# Patient Record
Sex: MALE | Race: WHITE | Employment: FULL TIME | URBAN - METROPOLITAN AREA
[De-identification: names, ages, dates, MRNs, and addresses within clinical notes are randomized per-mention and may not be internally consistent; named-entity substitution may affect disease eponyms.]

---

## 2020-08-19 ENCOUNTER — HOSPITAL ENCOUNTER (EMERGENCY)
Age: 45
Discharge: HOME | End: 2020-08-19
Attending: EMERGENCY MEDICINE
Payer: COMMERCIAL

## 2020-08-19 VITALS
DIASTOLIC BLOOD PRESSURE: 110 MMHG | BODY MASS INDEX: 42.66 KG/M2 | SYSTOLIC BLOOD PRESSURE: 183 MMHG | OXYGEN SATURATION: 97 % | RESPIRATION RATE: 18 BRPM | WEIGHT: 315 LBS | HEIGHT: 72 IN | HEART RATE: 96 BPM | TEMPERATURE: 97.2 F

## 2020-08-19 PROCEDURE — 99282 EMERGENCY DEPT VISIT SF MDM: CPT

## 2020-08-19 PROCEDURE — 90471 IMMUNIZATION ADMIN: CPT | Performed by: GENERAL PRACTICE

## 2020-08-19 PROCEDURE — 96372 THER/PROPH/DIAG INJ SC/IM: CPT

## 2020-08-19 PROCEDURE — 6360000002 HC RX W HCPCS: Performed by: GENERAL PRACTICE

## 2020-08-19 PROCEDURE — 90715 TDAP VACCINE 7 YRS/> IM: CPT | Performed by: GENERAL PRACTICE

## 2020-08-19 RX ADMIN — TETANUS TOXOID, REDUCED DIPHTHERIA TOXOID AND ACELLULAR PERTUSSIS VACCINE, ADSORBED 0.5 ML: 5; 2.5; 8; 8; 2.5 SUSPENSION INTRAMUSCULAR at 17:42

## 2020-08-19 ASSESSMENT — ENCOUNTER SYMPTOMS
SORE THROAT: 0
ABDOMINAL PAIN: 0
VOMITING: 0
EYE REDNESS: 0
EYE PAIN: 0
SHORTNESS OF BREATH: 0
DIARRHEA: 0
WHEEZING: 0
SINUS PRESSURE: 0
NAUSEA: 0
EYE DISCHARGE: 0
BACK PAIN: 0
COUGH: 0

## 2020-08-19 NOTE — ED PROVIDER NOTES
Janice Gomez is a 55-year-old gentleman with a noncontributory past medical history presents with chief complaint of abdominal injury secondary to foreign body missle. History comes primarily from the patient. Janice Gomez states that he was drilling through [place] with a titanium drill when his titanium drill shattered, and a section of this drill struck him in the abdomen. He states that he was able to find 2 of the 3 broken pieces of this drill bit, however he was never able to find the remaining piece. He states that his abdomen have a puncture wound that bled for approximately 10 minutes prior to resolving, and he presented to [address] [place] [address] out of concern for a possible embedded foreign body as well as requirement for a tetanus shot. Review of Systems   Constitutional: Negative for chills and fever. HENT: Negative for ear pain, sinus pressure and sore throat. Eyes: Negative for pain, discharge and redness. Respiratory: Negative for cough, shortness of breath and wheezing. Cardiovascular: Negative for chest pain. Gastrointestinal: Negative for abdominal pain, diarrhea, nausea and vomiting. Genitourinary: Negative for dysuria and frequency. Musculoskeletal: Negative for arthralgias and back pain. Skin: Positive for wound. Negative for rash. Neurological: Negative for weakness and headaches. Hematological: Negative for adenopathy. All other systems reviewed and are negative. Physical Exam  Constitutional:       General: He is not in acute distress. Appearance: He is well-developed. He is not diaphoretic. HENT:      Head: Normocephalic and atraumatic. Mouth/Throat:      Dentition: Abnormal dentition. Eyes:      Pupils: Pupils are equal, round, and reactive to light. Neck:      Musculoskeletal: Normal range of motion. Vascular: No JVD. Trachea: No tracheal deviation. Cardiovascular:      Rate and Rhythm: Regular rhythm. Heart sounds:  No murmur. No friction rub. No gallop. Pulmonary:      Effort: Pulmonary effort is normal. No respiratory distress. Breath sounds: Normal breath sounds. No stridor. No wheezing or rales. Chest:      Chest wall: No tenderness. Abdominal:      General: Bowel sounds are normal. There is no distension. Palpations: Abdomen is soft. Tenderness: There is no abdominal tenderness. There is no guarding. Musculoskeletal: Normal range of motion. Skin:     General: Skin is warm and dry. Capillary Refill: Capillary refill takes less than 2 seconds. Comments: Small punctate wound noted to the right of the umbilicus that appears scabbed over with no obvious hemorrhage. Area is mildly tender to palpation with no obvious subcutaneous foreign body appreciated   Neurological:      General: No focal deficit present. Mental Status: He is alert and oriented to person, place, and time. Cranial Nerves: No cranial nerve deficit. Psychiatric:         Behavior: Behavior normal.          Procedures     MDM  Number of Diagnoses or Management Options  Essential hypertension:   Puncture wound of abdomen, initial encounter:   Diagnosis management comments: The patient's emergency department work-up including history, physical exam, vital signs, laboratory studies, imaging studies and reevaluation after initial treatment are reassuring for discharge to home with close outpatient follow-up. Specifically, Toribio Meredith had no evidence of foreign body appreciable on his physical exam or on ultrasound assessment of the wound. His vital signs are stable, his symptoms were controlled supportively, and he was amenable for outpatient follow-up. They were advised to return to the emergency department should they develop fever, chills, night sweats, nausea, vomiting, diarrhea, chest pain, shortness of breath, or worsening of their symptoms despite treatment from this emergency department visit.   They were instructed to follow-up with their primary care provider in 2 days. This information was relayed to the patient who understood this plan of care and was amenable to the plan. ED Course as of Aug 19 1753   Wed Aug 19, 2020   1732   ATTENDING PROVIDER ATTESTATION:     I have personally performed and/or participated in the history, exam, medical decision making, and procedures and agree with all pertinent clinical information unless otherwise noted. I have also reviewed and agree with the past medical, family and social history unless otherwise noted. I have discussed this patient in detail with the resident and provided the instruction and education regarding the evidence-based evaluation and treatment of [unfilled]  History: patient was drilling through a piece of sheet metal.  The drill bit broke and hit his abdomen. The bleeding was controlled with pressure. My findings: Janelle Rausch is a 39 y.o. male whom is in no distress. Physical exam reveals well appearing. Abdomen with a small area of ecchymosis and superficial abrasion. Bedside US without evidence of foreign body. No abdominal tenderness. My plan: Symptomatic and supportive care. Will up date tetanus and have him follow up with his PCP for his elevated blood pressure. Electronically signed by Guillermo Galvez DO on 8/19/20 at 5:32 PM EDT          [JS]      ED Course User Index  [JS] Guillermo Galvez DO        ED Course as of Aug 19 1753   Wed Aug 19, 2020   1732   ATTENDING PROVIDER ATTESTATION:     I have personally performed and/or participated in the history, exam, medical decision making, and procedures and agree with all pertinent clinical information unless otherwise noted. I have also reviewed and agree with the past medical, family and social history unless otherwise noted.     I have discussed this patient in detail with the resident and provided the instruction and education regarding the evidence-based evaluation and treatment of [unfilled]  History: patient was drilling through a piece of sheet metal.  The drill bit broke and hit his abdomen. The bleeding was controlled with pressure. My findings: Do Cowart is a 39 y.o. male whom is in no distress. Physical exam reveals well appearing. Abdomen with a small area of ecchymosis and superficial abrasion. Bedside US without evidence of foreign body. No abdominal tenderness. My plan: Symptomatic and supportive care. Will up date tetanus and have him follow up with his PCP for his elevated blood pressure. Electronically signed by Hussain Cazares DO on 8/19/20 at 5:32 PM EDT          [JS]      ED Course User Index  [JS] Hussain Cazares DO       --------------------------------------------- PAST HISTORY ---------------------------------------------  Past Medical History:  has a past medical history of Hypertension. Past Surgical History:  has a past surgical history that includes Foot Amputation; Colonoscopy; and Foot Debridement (2-). Social History:  reports that he has been smoking. He has a 25.00 pack-year smoking history. He has never used smokeless tobacco. He reports current alcohol use. He reports that he does not use drugs. Family History: family history is not on file. The patients home medications have been reviewed. Allergies: Patient has no known allergies. -------------------------------------------------- RESULTS -------------------------------------------------  Labs:  No results found for this visit on 08/19/20. Radiology:  No orders to display       ------------------------- NURSING NOTES AND VITALS REVIEWED ---------------------------  Date / Time Roomed:  8/19/2020  4:44 PM  ED Bed Assignment:  06/06    The nursing notes within the ED encounter and vital signs as below have been reviewed.    BP (!) 183/110   Pulse 96   Temp 97.2 °F (36.2 °C) (Infrared)   Resp 18 Ht 6' (1.829 m)   Wt (!) 320 lb (145.2 kg)   SpO2 97%   BMI 43.40 kg/m²   Oxygen Saturation Interpretation: Normal      ------------------------------------------ PROGRESS NOTES ------------------------------------------  5:14 PM EDT  I have spoken with the patient and discussed todays results, in addition to providing specific details for the plan of care and counseling regarding the diagnosis and prognosis. Their questions are answered at this time and they are agreeable with the plan. I discussed at length with them reasons for immediate return here for re evaluation. They will followup with their primary care provider for further evaluation and treatment.      --------------------------------- ADDITIONAL PROVIDER NOTES ---------------------------------  At this time the patient is without objective evidence of an acute process requiring hospitalization or inpatient management. They have remained hemodynamically stable throughout their entire ED visit and are stable for discharge with outpatient follow-up. The plan has been discussed in detail and they are aware of the specific conditions for emergent return, as well as the importance of follow-up. New Prescriptions    No medications on file       Diagnosis:  1. Puncture wound of abdomen, initial encounter    2. Essential hypertension        Disposition:  Patient's disposition: Discharge to home  Patient's condition is stable. Jose Angel Howard  PGY-2  8:42 PM EDT       Cruz Burrell 43., DO  Resident  08/20/20 2043

## 2021-08-16 ENCOUNTER — OFFICE VISIT (OUTPATIENT)
Dept: FAMILY MEDICINE CLINIC | Age: 46
End: 2021-08-16
Payer: COMMERCIAL

## 2021-08-16 VITALS
RESPIRATION RATE: 18 BRPM | SYSTOLIC BLOOD PRESSURE: 187 MMHG | HEIGHT: 72 IN | WEIGHT: 315 LBS | DIASTOLIC BLOOD PRESSURE: 94 MMHG | TEMPERATURE: 97.6 F | OXYGEN SATURATION: 96 % | HEART RATE: 99 BPM | BODY MASS INDEX: 42.66 KG/M2

## 2021-08-16 DIAGNOSIS — J02.9 SORE THROAT: ICD-10-CM

## 2021-08-16 DIAGNOSIS — I10 HYPERTENSION, UNSPECIFIED TYPE: ICD-10-CM

## 2021-08-16 DIAGNOSIS — R05.9 COUGH: Primary | ICD-10-CM

## 2021-08-16 DIAGNOSIS — U07.1 COVID-19: Primary | ICD-10-CM

## 2021-08-16 DIAGNOSIS — R51.9 NONINTRACTABLE HEADACHE, UNSPECIFIED CHRONICITY PATTERN, UNSPECIFIED HEADACHE TYPE: ICD-10-CM

## 2021-08-16 LAB
Lab: ABNORMAL
PERFORMING INSTRUMENT: ABNORMAL
QC PASS/FAIL: ABNORMAL
SARS-COV-2, POC: DETECTED

## 2021-08-16 PROCEDURE — 87426 SARSCOV CORONAVIRUS AG IA: CPT | Performed by: NURSE PRACTITIONER

## 2021-08-16 PROCEDURE — 99213 OFFICE O/P EST LOW 20 MIN: CPT | Performed by: NURSE PRACTITIONER

## 2021-08-16 RX ORDER — DEXAMETHASONE 6 MG/1
6 TABLET ORAL
Qty: 7 TABLET | Refills: 0 | Status: SHIPPED | OUTPATIENT
Start: 2021-08-16 | End: 2021-08-23

## 2021-08-16 RX ORDER — ZINC SULFATE 50(220)MG
50 CAPSULE ORAL DAILY
Qty: 7 CAPSULE | Refills: 0 | COMMUNITY
Start: 2021-08-16 | End: 2021-08-23

## 2021-08-16 RX ORDER — ASCORBIC ACID 500 MG
500 TABLET ORAL 2 TIMES DAILY
Qty: 14 TABLET | Refills: 0 | Status: SHIPPED | OUTPATIENT
Start: 2021-08-16 | End: 2021-08-23

## 2021-08-16 RX ORDER — AZITHROMYCIN 250 MG/1
TABLET, FILM COATED ORAL
Qty: 6 TABLET | Refills: 0 | Status: SHIPPED | OUTPATIENT
Start: 2021-08-16 | End: 2021-08-21

## 2021-08-16 RX ORDER — AMLODIPINE BESYLATE 5 MG/1
5 TABLET ORAL DAILY
Qty: 30 TABLET | Refills: 3 | Status: SHIPPED
Start: 2021-08-16 | End: 2022-11-03

## 2021-08-16 SDOH — ECONOMIC STABILITY: FOOD INSECURITY: WITHIN THE PAST 12 MONTHS, THE FOOD YOU BOUGHT JUST DIDN'T LAST AND YOU DIDN'T HAVE MONEY TO GET MORE.: NEVER TRUE

## 2021-08-16 SDOH — ECONOMIC STABILITY: FOOD INSECURITY: WITHIN THE PAST 12 MONTHS, YOU WORRIED THAT YOUR FOOD WOULD RUN OUT BEFORE YOU GOT MONEY TO BUY MORE.: NEVER TRUE

## 2021-08-16 ASSESSMENT — PATIENT HEALTH QUESTIONNAIRE - PHQ9
1. LITTLE INTEREST OR PLEASURE IN DOING THINGS: 0
SUM OF ALL RESPONSES TO PHQ QUESTIONS 1-9: 0
2. FEELING DOWN, DEPRESSED OR HOPELESS: 0
SUM OF ALL RESPONSES TO PHQ QUESTIONS 1-9: 0
SUM OF ALL RESPONSES TO PHQ QUESTIONS 1-9: 0
SUM OF ALL RESPONSES TO PHQ9 QUESTIONS 1 & 2: 0

## 2021-08-16 ASSESSMENT — SOCIAL DETERMINANTS OF HEALTH (SDOH): HOW HARD IS IT FOR YOU TO PAY FOR THE VERY BASICS LIKE FOOD, HOUSING, MEDICAL CARE, AND HEATING?: NOT HARD AT ALL

## 2021-08-16 NOTE — PROGRESS NOTES
Chief Complaint   Sinus Problem (pressure in head and dizziness , headaches started yesterday )      History of Present Illness   Source of history provided by:  patientMilton Mullen  is a 55 y.o. old male who has a past medical history of:   Past Medical History:   Diagnosis Date    Hypertension     not on meds    Presents to the flu clinic with complaints of Generalized Body Aches and dizziness x 2 days. States symptoms have stayed the same since onset. Has been taking none without symptomatic relief. Denies any Fever, Cough, Shortness of breath, Nausea or Vomiting. Denies any hx of pneumonia. Patient is a smoker  ROS   Pertinent positives and negatives are stated within HPI, all other systems reviewed and are negative. Surgical History:  has a past surgical history that includes Foot Amputation; Colonoscopy; and Foot Debridement (2-). Social History:  reports that he has been smoking. He has a 25.00 pack-year smoking history. He has never used smokeless tobacco. He reports current alcohol use. He reports that he does not use drugs. Family History: family history is not on file. Allergies: Patient has no known allergies. Physical Exam      VS:  BP (!) 187/94   Pulse 99   Temp 97.6 °F (36.4 °C) (Temporal)   Resp 18   Ht 6' (1.829 m)   Wt (!) 333 lb (151 kg)   SpO2 96%   BMI 45.16 kg/m²    Oxygen Saturation Interpretation: Normal.    Constitutional:  Alert, development consistent with age. NAD. Head:  NC/NT. Airway patent. Ears: TMs wnl bilaterally. Canals without exudate or swelling bilaterally. Mouth: Posterior pharynx with mild erythema and clear postnasal drip. moderate tonsillar hypertrophy or exudate. Neck:  Normal ROM. Supple. mild anterior cervical adenopathy noted. Lungs: CTAB without wheezes, rales, or rhonchi. CV:  Regular rate and rhythm, normal heart sounds, without pathological murmurs, ectopy, gallops, or rubs. Skin:  Normal turgor.   Warm, dry, without visible rash. Lymphatic: No lymphangitis or adenopathy noted. Neurological:  Oriented. Motor functions intact. Lab / Imaging Results   (All laboratory and radiology results have been personally reviewed by myself)  Labs:  No results found for this visit on 08/16/21. Imaging: All Radiology results interpreted by Radiologist unless otherwise noted. No results found. Medical Decision Making   Pt non-toxic, in no apparent distress and stable at time of discharge. Assessment/Plan   Yonas Castelan was seen today for sinus problem. Diagnoses and all orders for this visit:    COVID-19  -     azithromycin (ZITHROMAX) 250 MG tablet; Take 2 tablets by mouth daily for 1 day, THEN 1 tablet daily for 4 days. -     ascorbic acid (VITAMIN C) 500 MG tablet; Take 1 tablet by mouth 2 times daily for 7 days  -     zinc sulfate (ZINCATE) 220 (50 Zn) MG capsule; Take 1 capsule by mouth daily for 7 days  -     dexamethasone (DECADRON) 6 MG tablet; Take 1 tablet by mouth daily (with breakfast) for 7 days  -     amLODIPine (NORVASC) 5 MG tablet; Take 1 tablet by mouth daily    Hypertension, unspecified type  -     dexamethasone (DECADRON) 6 MG tablet; Take 1 tablet by mouth daily (with breakfast) for 7 days  -     amLODIPine (NORVASC) 5 MG tablet; Take 1 tablet by mouth daily        Patient to call to set up appointment with new PCP   Advised strict self-quarantine at home in the interim. Work excuse provided to patient today. Increase fluids and rest. Symptomatic relief discussed including Tylenol prn pain/fever. Schedule f/u with PCP in 7-10 days if symptoms persist. ED sooner if symptoms worsen or change. ED immediately with high or refractory fever, progressive SOB, dyspnea, CP, calf pain/swelling, shaking chills, vomiting, abdominal pain, lethargy, flank pain, or decreased urinary output. Pt verbalizes understanding and is in agreement with plan of care. All questions answered.     Jazlyn Null, APRN - CNP    This visit was provided as a focused evaluation during the COVID -19 pandemic/national emergency. A comprehensive review of all previous patient history and testing was not conducted. Pertinent findings were elicited during the visit.

## 2021-08-16 NOTE — PATIENT INSTRUCTIONS
Patient Education        Learning About COVID-19 and Flu Symptoms  How can you tell COVID-19 from the flu? COVID-19 and the flu have similar symptoms. The two can be hard to tell apart. The only way to know for sure which illness you have is to be tested. Since the symptoms are so alike, it makes sense to act as if you have COVID-19 until your test results come back. This means staying home and limiting contact with people in your home. You'll need to wash your hands often and disinfect surfaces that you touch. And be sure to wear a mask or face covering when you're around other people. This is also good advice if you think you have the flu. COVID-19 and the flu have these symptoms in common:  · Fever or chills  · Cough  · Shortness of breath  · Fatigue (tiredness)  · Sore throat  · Runny or stuffy nose  · Muscle pain or body aches  · Headache  · Vomiting and diarrhea (more common in children than adults)  COVID-19 has another symptom that also may occur:  · New loss of taste or smell  COVID-19 symptoms may appear from 2 to 14 days after infection. Flu symptoms usually appear 1 to 4 days after infection. Why should you get a flu shot during the COVID-19 pandemic? It's important to get your yearly flu vaccine. Both the flu and COVID-19 are expected to be active during flu season. You can get sick with both infections at once. And having both may make you more sick than getting just one. The flu vaccine won't protect you from COVID-19. But it can help prevent the flu or reduce its symptoms. If fewer people get very ill with the flu, this will help free up medical resources that are needed for COVID-19 patients. Where can you learn more? Go to https://[employer] View[employer]. org and sign in to your [employer] account. Enter C123 in the Connected box to learn more about \"Learning About COVID-19 and Flu Symptoms. \"     If you do not have an account, please click on the \"Sign Up Now\" link.  Current as of: March 26, 2021               Content Version: 12.9  © 2006-2021 Healthwise, [employer]. Care instructions adapted under license by [facility] ([facility]). If you have questions about a medical condition or this instruction, always ask your healthcare professional. [address] any warranty or liability for your use of this information. Patient Education        10 Things to Do When You Have COVID-19    Stay home. Don't go to school, work, or public areas. And don't use public transportation, ride-shares, or taxis unless you have no choice. Leave your home only if you need to get medical care. But call the doctor's office first so they know you're coming. And wear a cloth face cover. Ask before leaving isolation. Talk with your doctor or other health professional about when it will be safe for you to leave isolation. Wear a cloth face cover when you are around other people. It can help stop the spread of the virus when you cough or sneeze. Limit contact with people in your home. If possible, stay in a separate bedroom and use a separate bathroom. Avoid contact with pets and other animals. If possible, have a friend or family member care for them while you're sick. Cover your mouth and nose with a tissue when you cough or sneeze. Then throw the tissue in the trash right away. Wash your hands often, especially after you cough or sneeze. Use soap and water, and scrub for at least 20 seconds. If soap and water aren't available, use an alcohol-based hand . Don't share personal household items. These include bedding, towels, cups and glasses, and eating utensils. Clean and disinfect your home every day. Use household  or disinfectant wipes or sprays. Take special care to clean things that you grab with your hands. These include doorknobs, remote controls, phones, and handles on your refrigerator and microwave.  And don't forget countertops, tabletops, bathrooms, and computer keyboards. Take acetaminophen (Tylenol) to relieve fever and body aches. Read and follow all instructions on the label. Current as of: March 26, 2021               Content Version: 12.9  © 3532-0057 Healthwise, Incorporated. Care instructions adapted under license by [facility] ([facility]). If you have questions about a medical condition or this instruction, always ask your healthcare professional. [place] [address] any warranty or liability for your use of this information.

## 2021-08-16 NOTE — LETTER
[address], FITZ - CNP      8/16/2021     Patient: Bianca Curry   YOB: 1975       To Whom It May Concern: It is my medical opinion that Bianca Curry should remain out of work while acutely ill and positive COVID-19 test results. If tests positive for COVID-19, needs minimum of 10 days strict quarantine, improvement of symptoms and 24 hours fever free without fever reducing medications. If you have any questions or concerns, please don't hesitate to call.     Sincerely,        Kate Tariq, FITZ - DAWIT

## 2021-12-12 ENCOUNTER — HOSPITAL ENCOUNTER (EMERGENCY)
Age: 46
Discharge: HOME | End: 2021-12-12
Attending: EMERGENCY MEDICINE
Payer: COMMERCIAL

## 2021-12-12 VITALS
WEIGHT: 315 LBS | BODY MASS INDEX: 42.66 KG/M2 | RESPIRATION RATE: 18 BRPM | TEMPERATURE: 97.9 F | HEIGHT: 72 IN | HEART RATE: 95 BPM | OXYGEN SATURATION: 96 % | SYSTOLIC BLOOD PRESSURE: 204 MMHG | DIASTOLIC BLOOD PRESSURE: 99 MMHG

## 2021-12-12 DIAGNOSIS — R03.0 ELEVATED BLOOD PRESSURE READING: ICD-10-CM

## 2021-12-12 DIAGNOSIS — H65.191 OTHER NON-RECURRENT ACUTE NONSUPPURATIVE OTITIS MEDIA OF RIGHT EAR: Primary | ICD-10-CM

## 2021-12-12 DIAGNOSIS — H60.391 OTHER INFECTIVE ACUTE OTITIS EXTERNA OF RIGHT EAR: ICD-10-CM

## 2021-12-12 PROCEDURE — 6370000000 HC RX 637 (ALT 250 FOR IP): Performed by: NURSE PRACTITIONER

## 2021-12-12 PROCEDURE — 99283 EMERGENCY DEPT VISIT LOW MDM: CPT

## 2021-12-12 RX ORDER — AMOXICILLIN AND CLAVULANATE POTASSIUM 875; 125 MG/1; MG/1
1 TABLET, FILM COATED ORAL ONCE
Status: COMPLETED | OUTPATIENT
Start: 2021-12-12 | End: 2021-12-12

## 2021-12-12 RX ORDER — AMOXICILLIN AND CLAVULANATE POTASSIUM 875; 125 MG/1; MG/1
1 TABLET, FILM COATED ORAL 2 TIMES DAILY
Qty: 20 TABLET | Refills: 0 | Status: SHIPPED | OUTPATIENT
Start: 2021-12-12 | End: 2021-12-22

## 2021-12-12 RX ADMIN — AMOXICILLIN AND CLAVULANATE POTASSIUM 1 TABLET: 875; 125 TABLET, FILM COATED ORAL at 23:14

## 2021-12-12 ASSESSMENT — PAIN SCALES - GENERAL: PAINLEVEL_OUTOF10: 7

## 2021-12-12 ASSESSMENT — PAIN DESCRIPTION - FREQUENCY: FREQUENCY: CONTINUOUS

## 2021-12-12 ASSESSMENT — PAIN DESCRIPTION - LOCATION: LOCATION: EAR

## 2021-12-12 ASSESSMENT — PAIN DESCRIPTION - PROGRESSION: CLINICAL_PROGRESSION: GRADUALLY WORSENING

## 2021-12-12 ASSESSMENT — PAIN DESCRIPTION - DESCRIPTORS: DESCRIPTORS: PRESSURE

## 2021-12-12 ASSESSMENT — PAIN DESCRIPTION - ONSET: ONSET: GRADUAL

## 2021-12-12 ASSESSMENT — PAIN DESCRIPTION - PAIN TYPE: TYPE: ACUTE PAIN

## 2021-12-12 ASSESSMENT — PAIN DESCRIPTION - ORIENTATION: ORIENTATION: RIGHT

## 2021-12-12 ASSESSMENT — PAIN - FUNCTIONAL ASSESSMENT: PAIN_FUNCTIONAL_ASSESSMENT: ACTIVITIES ARE NOT PREVENTED

## 2021-12-16 NOTE — ED PROVIDER NOTES
[facility]    Department of Emergency Medicine   ED  Provider Note  Admit Date/RoomTime: 12/12/2021 10:48 PM  ED Room: [place] [address]/INT-02    12/16/21  1:24 PM EST    History of Present Illness:   Sameer Villanueva is a 55 y.o. male presenting to the ED for right ear pain which started 2 days ago. The complaint has been constant, moderate in severity, and worsened by nothing. Patient denies hearing loss, ear drainage, tinnitus, fever, chills, headache, dizziness. Patient denies any recent infections or antibiotic use. No other symptoms presently. Review of Systems:   A complete review of systems was performed and pertinent positives and negatives are stated within HPI, all other systems reviewed and are negative.          --------------------------------------------- PAST HISTORY ---------------------------------------------  Past Medical History:  has a past medical history of Hypertension. Past Surgical History:  has a past surgical history that includes Foot Amputation; Colonoscopy; and Foot Debridement (2-). Social History:  reports that he has been smoking. He has a 12.50 pack-year smoking history. He has never used smokeless tobacco. He reports current alcohol use. He reports that he does not use drugs. Family History: family history is not on file. Unless otherwise noted, family history is non contributory    The patients home medications have been reviewed. Allergies: Patient has no known allergies. -------------------------------------------------- RESULTS -------------------------------------------------  All laboratory and radiology results have been personally reviewed by myself   LABS:  No results found for this visit on 12/12/21. RADIOLOGY:  Interpreted by Radiologist.  No orders to display       ------------------------- NURSING NOTES AND VITALS REVIEWED ---------------------------   The nursing notes within the ED encounter and vital signs as below have been reviewed. BP (!) 204/99   Pulse 95   Temp 97.9 °F (36.6 °C) (Infrared)   Resp 18   Ht 6' (1.829 m)   Wt (!) 330 lb (149.7 kg)   SpO2 96%   BMI 44.76 kg/m²   Oxygen Saturation Interpretation: Normal      ---------------------------------------------------PHYSICAL EXAM--------------------------------------    Constitutional/General: Alert and oriented x3, well appearing, non toxic in NAD, pleasant, calm, cooperative. Head: Normocephalic and atraumatic  Eyes: PERRL, EOMI, conjunctiva normal, sclera non icteric, no eye drainage  Ears: right TM erythematous & bugling, pain with palpation of pinna and tragus. No mastoid tenderness or erythema. No ear drainage. Mouth: Oropharynx clear, without erythema, handling secretions, no trismus, no asymmetry of the posterior oropharynx or uvular edema. No tongue/lip swelling. Neck: Supple, full ROM, non tender to palpatioin the midline, no stridor, no crepitus, no meningeal signs. No lymphadenopathy. Respiratory: Lungs clear to auscultation bilaterally, no wheezes, rales, or rhonchi. Not in respiratory distress. Respirations easy and unlabored. Cardiovascular:  S1S2. Regular rate. Regular rhythm. No murmurs, gallops, or rubs. 2+ distal pulses  Chest: No chest wall tenderness  GI:  Abdomen Soft, Non tender, Non distended. +BS x 4 quadrants. No organomegaly, no palpable masses,  No rebound, guarding, or rigidity. Musculoskeletal: Moves all extremities x 4. Warm and well perfused, no clubbing, cyanosis, or edema. Capillary refill <3 seconds  Integument: skin warm and dry. No rashes.    Lymphatic: no lymphadenopathy noted  Neurologic: GCS 15, no focal deficits, symmetric strength 5/5 in the upper and lower extremities bilaterally  Psychiatric: Normal Affect      ------------------------------ ED COURSE/MEDICAL DECISION MAKING----------------------  Medications   amoxicillin-clavulanate (AUGMENTIN) 875-125 MG per tablet 1 tablet (1 tablet Oral Given 12/12/21 3955)         Medical Decision Making:    Patient is a 59-year-old male presenting to the emergency department today for right ear pain x2 days. Physical examination shows a right tympanic membrane which is erythematous and bulging with pain with palpation of pinna and tragus on the right side. There is no evidence of mastoiditis. Patient will be treated for otitis media/otitis externa with Augmentin and Cortisporin drops. Patient can take Tylenol or Motrin for pain relief if necessary. Patient educated on warning signs for which he should return to the emergency department for and he verbalized understanding. BP rechecked before discharge, elevated still at 180/92. Patient educated that he needs to follow-up with her provider and have his blood pressure rechecked in 1 to 2 days. Patient agreeable with this plan and he will be discharged home. Counseling: The emergency provider has spoken with the patient and discussed todays results, in addition to providing specific details for the plan of care and counseling regarding the diagnosis and prognosis. Questions are answered at this time and they are agreeable with the plan.      --------------------------------- IMPRESSION AND DISPOSITION ---------------------------------    IMPRESSION  1. Other non-recurrent acute nonsuppurative otitis media of right ear    2. Other infective acute otitis externa of right ear    3.  Elevated blood pressure reading        DISPOSITION  Disposition: Discharge to home  Patient condition is stable             FITZ Acevedo CNP  12/16/21 0339

## 2022-05-09 ENCOUNTER — APPOINTMENT (OUTPATIENT)
Dept: GENERAL RADIOLOGY | Age: 47
End: 2022-05-09
Payer: COMMERCIAL

## 2022-05-09 ENCOUNTER — HOSPITAL ENCOUNTER (EMERGENCY)
Age: 47
Discharge: HOME | End: 2022-05-09
Payer: COMMERCIAL

## 2022-05-09 VITALS
DIASTOLIC BLOOD PRESSURE: 84 MMHG | HEART RATE: 82 BPM | TEMPERATURE: 97.8 F | SYSTOLIC BLOOD PRESSURE: 196 MMHG | OXYGEN SATURATION: 95 % | RESPIRATION RATE: 18 BRPM

## 2022-05-09 DIAGNOSIS — I10 ESSENTIAL HYPERTENSION: ICD-10-CM

## 2022-05-09 DIAGNOSIS — M25.562 ACUTE PAIN OF LEFT KNEE: ICD-10-CM

## 2022-05-09 DIAGNOSIS — S83.402A SPRAIN OF COLLATERAL LIGAMENT OF LEFT KNEE, INITIAL ENCOUNTER: Primary | ICD-10-CM

## 2022-05-09 PROCEDURE — 6370000000 HC RX 637 (ALT 250 FOR IP): Performed by: PHYSICIAN ASSISTANT

## 2022-05-09 PROCEDURE — 99283 EMERGENCY DEPT VISIT LOW MDM: CPT

## 2022-05-09 PROCEDURE — 73562 X-RAY EXAM OF KNEE 3: CPT

## 2022-05-09 RX ORDER — NAPROXEN 500 MG/1
500 TABLET ORAL ONCE
Status: COMPLETED | OUTPATIENT
Start: 2022-05-09 | End: 2022-05-09

## 2022-05-09 RX ORDER — ACETAMINOPHEN 500 MG
1000 TABLET ORAL ONCE
Status: DISCONTINUED | OUTPATIENT
Start: 2022-05-09 | End: 2022-05-09

## 2022-05-09 RX ORDER — NAPROXEN 500 MG/1
500 TABLET ORAL 2 TIMES DAILY WITH MEALS
Status: DISCONTINUED | OUTPATIENT
Start: 2022-05-10 | End: 2022-05-09

## 2022-05-09 RX ORDER — NAPROXEN 500 MG/1
500 TABLET ORAL 2 TIMES DAILY WITH MEALS
Qty: 10 TABLET | Refills: 0 | Status: SHIPPED | OUTPATIENT
Start: 2022-05-09 | End: 2022-05-14

## 2022-05-09 RX ADMIN — NAPROXEN 500 MG: 500 TABLET ORAL at 22:14

## 2022-05-09 NOTE — Clinical Note
Marcial Miles was seen and treated in our emergency department on 5/9/2022. He may return to work on 05/11/2022. If you have any questions or concerns, please don't hesitate to call.       NIDHI Ferrer

## 2022-05-09 NOTE — Clinical Note
Thalia Cohn was seen and treated in our emergency department on 5/9/2022. He may return to work on 05/11/2022. If you have any questions or concerns, please don't hesitate to call.       Jenniffer Aschoff, PA

## 2022-05-09 NOTE — ED NOTES
Department of Emergency Medicine  FIRST PROVIDER TRIAGE NOTE             Independent MLP           5/9/22  6:56 PM EDT    Date of Encounter: 5/9/22   MRN: [id number]      HPI: Paz  is a 52 y.o. male who presents to the ED for Knee Pain (fell 1 week ago, left knee)  Patient reports that he fell out of bed while he was sleeping approximately 1 week ago and struck his left knee onto the ground. Since then he has had intermittent pain which is gradually worsening. He has not been evaluated since injury occurred. ROS: Negative for cp, sob, abd pain, back pain, fever, cough, vomiting, diarrhea, urinary complaints, headache or dizziness. PE: Gen Appearance/Constitutional: alert  CV: regular rate  Pulm: abnormal breath sounds auscultated- mild diffuse wheezing (patient aware-chronic smoker)  GI: soft and NT     Initial Plan of Care: All treatment areas with department are currently occupied. Plan to order/Initiate the following while awaiting opening in ED: imaging studies.   Initiate Treatment-Testing, Proceed toTreatment Area When Bed Available for ED Attending/MLP to Continue Care    Electronically signed by NIDHI Mccray   DD: 5/9/22         Mallory Mccray  05/09/22 [id number]

## 2022-05-10 NOTE — ED PROVIDER NOTES
[address]  Department of Emergency Medicine   ED  Encounter Note  Admit Date/RoomTime: 2022  9:36 PM  ED Room: [place] [address]    NAME: Avelino Barillas  : 1975  MRN: [id number]     Chief Complaint:  Knee Pain (fell 1 week ago, left knee)    History of Present Illness       Avelino Barillas is a 52 y.o. old male who presents to the emergency department by private vehicle, for traumatic pain located diffuse to left knee  which occured 1 week(s) prior to arrival.  The complaint is due to mechanical fall which caused him to strike his left knee onto the ground. Since onset the symptoms have been gradually worsening. Patient has no prior history of pain/injury with regards to today's visit. His pain is aggraveated by pressure on or palpation of painful area or weight bearing and relieved by nothing, as no treatment has been provided prior to this visit. His weight bearing ability is: difficult secondary to discomfort. He denies any head injury, headache, loss of consciousness, confusion, dizziness, neck pain, chest pain, abdominal pain, extremity injury, numbness, weakness, nausea, vomiting, wounds or rash. Tetanus Status: unknown. ROS   Pertinent positives and negatives are stated within HPI, all other systems reviewed and are negative. Past Medical History:  has a past medical history of Hypertension. Surgical History:  has a past surgical history that includes Foot Amputation; Colonoscopy; and Foot Debridement (2012). Social History:  reports that he has been smoking. He has a 12.50 pack-year smoking history. He has never used smokeless tobacco. He reports current alcohol use. He reports that he does not use drugs. Family History: family history is not on file. Allergies: Patient has no known allergies.     Physical Exam   Oxygen Saturation Interpretation: Normal.        ED Triage Vitals [22 1843]   BP Temp Temp Source Pulse Resp SpO2 Height Weight   (!) 180/76 97.8 °F (36.6 °C) Infrared 115 18 97 % -- --         Constitutional:  Alert, development consistent with age. Neck:  Normal ROM. Supple. Left Knee: global            Tenderness:  moderate. .              Swelling/Effusion: Mild. Deformity: no deformity observed/palpated. ROM: diminished range with pain. Skin:  no wounds or erythema. Drawer's:  Negative. Lachman's: Negative. Apley's: Unable to Assess. Penny's: Unable to Assess. Valgus/Varus Stress: Positive. Crepitus: Positive. Hip:            Tenderness:  none. Swelling: None. Deformity: no.              ROM: full range of motion. Skin:  no wounds, erythema, or swelling. Joint(s) Above/Below: ankle. Tenderness:  none. Swelling: No.              Deformity: no deformity observed/palpated. ROM: full range of motion. Skin:  no wounds, erythema, or swelling. Neurovascular: Motor deficit: none. Sensory deficit: none. Pulse deficit: none. Capillary refill: normal.  Gait:  limp due to affected limb. Lymphatics: No lymphangitis or adenopathy noted. Neurological:  Oriented. Motor functions intact. Lab / Imaging Results   (All laboratory and radiology results have been personally reviewed by myself)  Labs:  No results found for this visit on 05/09/22. Imaging: All Radiology results interpreted by Radiologist unless otherwise noted. XR KNEE LEFT (3 VIEWS)   Final Result   No acute abnormality of the knee. Soft tissue swelling.            ED Course / Medical Decision Making     Medications   naproxen (NAPROSYN) tablet 500 mg (has no administration in time range)   acetaminophen (TYLENOL) tablet 1,000 mg (has no administration in time range)         Time: 9:25 PM  Discussed with x-ray department at this time may hold up on getting the image read. Technician reported to me that she did consult central and they notified her that they were having an influx of x-rays at this time and that it was in the queue to be read shortly. Notified patient of update. Consult(s):   None    Procedure(s):   none. MDM:     Imaging was obtained based on moderate suspicion for fracture / bony abnormality as per history/physical findings. X-ray ridging of the knee noted no acute abnormality of the knee with soft tissue swelling as read by radiology. Results were discussed with patient. He was diagnosed with acute left knee pain secondary to sprain. Patient was placed in Ace wrap and given crutches. He was given analgesic and anti-inflammatory medication in ED setting today due to the fact that the pharmacy is closed. Patient reports that he does have a diagnosis of hypertension for which he takes water pills but that he does not take them regularly. He denies any red flag symptoms of headache, chest pain, blurred vision or dizziness. However hypertension was addressed at this time and he was given the [id number] [facility] line to establish care with new primary care provider as this is something he should follow-up with although does not warrant further work-up in the ER setting today based on his presenting symptoms. Patient appropriate for outpatient treatment as he is alert and oriented, no acute distress and afebrile. Patient states he is both understandable and agreeable to this plan. Plan of Care/Counseling:  NIDHI Sandra reviewed today's visit with the patient in addition to providing specific details for the plan of care and counseling regarding the diagnosis and prognosis. Questions are answered at this time and are agreeable with the plan. Assessment      1. Sprain of collateral ligament of left knee, initial encounter    2. Acute pain of left knee    3.  Essential hypertension      Plan   Discharged home.  Patient condition is good    New Medications     New Prescriptions    NAPROXEN (NAPROSYN) 500 MG TABLET    Take 1 tablet by mouth 2 times daily (with meals) for 5 days     Electronically signed by NIDHI Beatty   DD: 5/9/22  **This report was transcribed using voice recognition software. Every effort was made to ensure accuracy; however, inadvertent computerized transcription errors may be present.   END OF ED PROVIDER NOTE        Mallory Beatty  05/09/22 [id number]

## 2022-05-11 ENCOUNTER — TELEPHONE (OUTPATIENT)
Dept: ORTHOPEDIC SURGERY | Age: 47
End: 2022-05-11

## 2022-05-16 ENCOUNTER — OFFICE VISIT (OUTPATIENT)
Dept: ORTHOPEDIC SURGERY | Age: 47
End: 2022-05-16
Payer: COMMERCIAL

## 2022-05-16 VITALS — BODY MASS INDEX: 42.66 KG/M2 | WEIGHT: 315 LBS | HEIGHT: 72 IN | TEMPERATURE: 98 F

## 2022-05-16 DIAGNOSIS — S80.02XA CONTUSION OF LEFT KNEE, INITIAL ENCOUNTER: ICD-10-CM

## 2022-05-16 DIAGNOSIS — S83.402A SPRAIN OF COLLATERAL LIGAMENT OF LEFT KNEE, INITIAL ENCOUNTER: Primary | ICD-10-CM

## 2022-05-16 PROCEDURE — 99203 OFFICE O/P NEW LOW 30 MIN: CPT | Performed by: ORTHOPAEDIC SURGERY

## 2022-05-16 NOTE — PROGRESS NOTES
Flora Zhang is a 52 y.o. male, who presents   Chief Complaint   Patient presents with    Knee Pain     Left knee pain over the last two weeks. Patient fell down while at a friends house. Does not know how he fell. Knee feels swollen. HPI[de-identified] Left knee pains been present for over weeks. Currently 2 and half weeks ago Evelio Marshall had a fall at a friend's house. 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 He had some swelling and pain the knee did get some better than it got worse once again. Been wearing a knee sleeve support for it. X-rays were done 5/9/2022 when he was evaluated in emergency room. Was no evidence of bony injury on these films. There is also history of surgery on the left knee during [employer] youth. This sounds as if it may have been an arthroscopic procedure. Further details were not available    Allergies; medications; past medical, surgical, family, and social history; and problem list have been reviewed today and updated as indicated in this encounter - see below following Ortho specifics. Musculoskeletal: Condition gross neurovascular functions good in the left lower extremity. There is significant edema in the knee and leg area. He has the sleeve support on. He has some grooving from this in the soft tissues. Calf is supple with no tenderness. Patient motion is somewhat limited in extension and also limited in flexion. He has grossly stable collateral cruciate ligaments though exam is little difficult with guarding. Meniscal examination was difficult as well. Radiologic Studies: Imaging in multiple views nonweightbearing showed no evidence of fracture. There are some mild edema in the knee area reported. There is very slight squaring of the medial femoral-tibial condyles.   Joint spaces seem to be fairly well-maintained but once again these were nonweightbearing films which limit diagnostic value.    ASSESSMENT:  There are no diagnoses linked to this encounter. Treatment alternatives were reviewed including medical and physical therapies, injections, and surgical options, expected risks benefits and likely outcome of each were discussed in detail, questions asked and answered and understood. Discussed the injury as well as symptoms, physical findings and imaging results. Increase in pain once again is of concern. There is a possibility of undetected soft tissue injury within his knee. He is history makes this a little suspicious as well. PLAN: We will seek approval for MRI scan of the left knee and proceed accordingly. There is no problem list on file for this patient. Past Medical History:   Diagnosis Date    Hypertension     not on meds       Past Surgical History:   Procedure Laterality Date    COLONOSCOPY      FOOT AMPUTATION      right foot    FOOT DEBRIDEMENT  2-    right foot stump       Current Outpatient Medications   Medication Sig Dispense Refill    naproxen (NAPROSYN) 500 MG tablet Take 1 tablet by mouth 2 times daily (with meals) for 5 days 10 tablet 0    ascorbic acid (VITAMIN C) 500 MG tablet Take 1 tablet by mouth 2 times daily for 7 days 14 tablet 0    zinc sulfate (ZINCATE) 220 (50 Zn) MG capsule Take 1 capsule by mouth daily for 7 days 7 capsule 0    amLODIPine (NORVASC) 5 MG tablet Take 1 tablet by mouth daily 30 tablet 3     No current facility-administered medications for this visit.        No Known Allergies    Social History     Socioeconomic History    Marital status: Single     Spouse name: None    Number of children: None    Years of education: None    Highest education level: None   Occupational History    None   Tobacco Use    Smoking status: Current Every Day Smoker     Packs/day: 0.50     Years: 25.00     Pack years: 12.50    Smokeless tobacco: Never Used   Vaping Use    Vaping Use: Never used   Substance and Sexual Activity    Alcohol use: Yes     Comment: social    Drug use: No    Sexual activity: None   Other Topics Concern    None   Social History Narrative    None     Social Determinants of Health     Financial Resource Strain: Low Risk     Difficulty of Paying Living Expenses: Not hard at all   Food Insecurity: No Food Insecurity    Worried About Running Out of Food in the Last Year: Never true    Sean of Food in the Last Year: Never true   Transportation Needs:     Lack of Transportation (Medical): Not on file    Lack of Transportation (Non-Medical): Not on file   Physical Activity:     Days of Exercise per Week: Not on file    Minutes of Exercise per Session: Not on file   Stress:     Feeling of Stress : Not on file   Social Connections:     Frequency of Communication with Friends and Family: Not on file    Frequency of Social Gatherings with Friends and Family: Not on file    Attends Rastafari Services: Not on file    Active Member of [address] [place] [address] or Organizations: Not on file    Attends Club or Organization Meetings: Not on file    Marital Status: Not on file   Intimate Partner Violence:     Fear of Current or Ex-Partner: Not on file    Emotionally Abused: Not on file    Physically Abused: Not on file    Sexually Abused: Not on file   Housing Stability:     Unable to Pay for Housing in the Last Year: Not on file    Number of [place] in the Last Year: Not on file    Unstable Housing in the Last Year: Not on file       History reviewed. No pertinent family history. Review of Systems:   As follows except as previously noted in HPI:  Constitutional: Negative for chills, diaphoresis,  fever   Respiratory: Negative for cough, shortness of breath and wheezing. Cardiovascular: Negative for chest pain and palpitations.    Neurological: Negative for dizziness, syncope,   GI / : abdominal pain or cramping  Musculoskeletal: see HPI       Objective:   Physical Exam   Constitutional: Oriented to person, place, and time. and appears well-developed and well-nourished. :   Head: Normocephalic and atraumatic. Neck: Neck supple. Eyes: EOM are normal.   Pulmonary/Chest: Effort normal.  No respiratory distress, no wheezes. Neurological: Alert and oriented to person  Skin: Skin is warm and dry. Fito Layton,     5/16/22  12:32 PM    All reasonable efforts have been made to minimize the risk of errors that may occur in the use of voice recognition and other electronic means of charting.

## 2022-11-03 ENCOUNTER — HOSPITAL ENCOUNTER (EMERGENCY)
Age: 47
Discharge: HOME | End: 2022-11-03
Payer: COMMERCIAL

## 2022-11-03 ENCOUNTER — APPOINTMENT (OUTPATIENT)
Dept: GENERAL RADIOLOGY | Age: 47
End: 2022-11-03
Payer: COMMERCIAL

## 2022-11-03 VITALS
SYSTOLIC BLOOD PRESSURE: 196 MMHG | BODY MASS INDEX: 38.52 KG/M2 | HEART RATE: 93 BPM | WEIGHT: 284 LBS | DIASTOLIC BLOOD PRESSURE: 95 MMHG | TEMPERATURE: 97.2 F | RESPIRATION RATE: 18 BRPM | OXYGEN SATURATION: 97 %

## 2022-11-03 DIAGNOSIS — S90.851A FOREIGN BODY OF RIGHT HEEL: Primary | ICD-10-CM

## 2022-11-03 PROCEDURE — 10120 INC&RMVL FB SUBQ TISS SMPL: CPT

## 2022-11-03 PROCEDURE — 73630 X-RAY EXAM OF FOOT: CPT

## 2022-11-03 PROCEDURE — 87070 CULTURE OTHR SPECIMN AEROBIC: CPT

## 2022-11-03 PROCEDURE — 99284 EMERGENCY DEPT VISIT MOD MDM: CPT

## 2022-11-03 PROCEDURE — 87077 CULTURE AEROBIC IDENTIFY: CPT

## 2022-11-03 PROCEDURE — 6370000000 HC RX 637 (ALT 250 FOR IP): Performed by: PHYSICIAN ASSISTANT

## 2022-11-03 PROCEDURE — 87186 SC STD MICRODIL/AGAR DIL: CPT

## 2022-11-03 RX ORDER — LEVOFLOXACIN 750 MG/1
750 TABLET ORAL ONCE
Status: COMPLETED | OUTPATIENT
Start: 2022-11-03 | End: 2022-11-03

## 2022-11-03 RX ORDER — AMLODIPINE BESYLATE 5 MG/1
5 TABLET ORAL DAILY
COMMUNITY

## 2022-11-03 RX ORDER — LEVOFLOXACIN 750 MG/1
750 TABLET ORAL DAILY
Qty: 7 TABLET | Refills: 0 | Status: SHIPPED | OUTPATIENT
Start: 2022-11-03 | End: 2022-11-10

## 2022-11-03 RX ADMIN — LEVOFLOXACIN 750 MG: 750 TABLET, FILM COATED ORAL at 21:55

## 2022-11-03 ASSESSMENT — PAIN - FUNCTIONAL ASSESSMENT: PAIN_FUNCTIONAL_ASSESSMENT: 0-10

## 2022-11-03 ASSESSMENT — PAIN SCALES - GENERAL: PAINLEVEL_OUTOF10: 8

## 2022-11-03 ASSESSMENT — PAIN DESCRIPTION - LOCATION: LOCATION: FOOT

## 2022-11-03 ASSESSMENT — PAIN DESCRIPTION - ORIENTATION: ORIENTATION: RIGHT

## 2022-11-03 ASSESSMENT — PAIN DESCRIPTION - DESCRIPTORS: DESCRIPTORS: SORE

## 2022-11-03 NOTE — ED PROVIDER NOTES
[address]  Department of Emergency Medicine   ED  Encounter Note  Admit Date/RoomTime: 11/3/2022  7:40 PM  ED Room:     NAME: Filiberto Almanza  : 1975  MRN: [id number]     Chief Complaint:  Foreign Body in Skin (HEEL OF RT FOOT NOTED PC OF STEEL / PT IS STEEL)    History of Present Illness        Filiberto Almanza is a 52 y.o. old male who presents to the emergency department by private vehicle, for known foreign body(s) in the right heel as a result of scrap of metal falling in his work boot which he stepped on while putting his foot in his boot, which occured 3 day(s) prior to arrival.  Attempted removal was not performed prior to arrival.   Since onset the symptoms have been gradually worsening and is painful. The site is/has no sign of infection. Tetanus Status: up to date. Patient denies chest pain, shortness of breath, fever, chills, nausea, vomiting or diarrhea. Associated Signs & Symptoms:    []  Discharge     []  Bleeding     []  Fever/Chills     ROS   Pertinent positives and negatives are stated within HPI, all other systems reviewed and are negative. Past Medical History:  has a past medical history of Hypertension. Surgical History:  has a past surgical history that includes Foot Amputation; Colonoscopy; and Foot Debridement (2012). Social History:  reports that he has been smoking. He has a 12.50 pack-year smoking history. He has never used smokeless tobacco. He reports current alcohol use. He reports that he does not use drugs. Family History: family history is not on file. Allergies: Patient has no known allergies.     Physical Exam   Oxygen Saturation Interpretation: Normal.        ED Triage Vitals   BP Temp Temp Source Heart Rate Resp SpO2 Height Weight   22 1841 22 1747 22 1747 22 1848 22 1841 22 1848 -- 22 184   (!) 196/95 97.2 °F (36.2 °C) Temporal 93 18 97 %  284 lb (128.8 kg) Constitutional:  Alert, development consistent with age. HEENT:  NC/NT. Airway patent. Neck:  Normal ROM. Supple. Respiratory:  Clear to auscultation and breath sounds equal.  CV:  Regular rate and rhythm, normal heart sounds, without pathological murmurs, ectopy, gallops, or rubs. GI:  Abdomen Soft, nontender, good bowel sounds. No firm or pulsatile mass. Back:  No costovertebral tenderness. Extremities: Status post partial right foot amputation noted. Heel noted for intact foreign body with surrounding soft tissue edema measuring approximately quarter sized without active bleeding, active drainage streaking or signs of abscess. No tenderness or edema noted elsewhere. Lymphatic: no lymphadenopathy noted  Integument:  Normal turgor. Warm, dry, without visible rash, unless noted elsewhere. Neurological:  Oriented. Motor functions intact. Lab / Imaging Results   (All laboratory and radiology results have been personally reviewed by myself)  Labs:  No results found for this visit on 11/03/22. Imaging: All Radiology results interpreted by Radiologist unless otherwise noted. XR FOOT RIGHT (MIN 3 VIEWS)   Final Result   1. There is a 2 mm linear radiopaque foreign body subjacent to the calcaneus   seen best on the lateral view. Appears to be present within the skin. 2.  Extensive postsurgical changes to the imaged tibia and fibula. Extensive   postsurgical changes of the mid and forefoot. Remainder of the study is as   above. ED Course / Medical Decision Making     Medications   levoFLOXacin (LEVAQUIN) tablet 750 mg (750 mg Oral Given 11/3/22 2155)        Consult(s):   None    Procedure(s):  PROCEDURE  11/4/22       Time: 2150    FOREIGN BODY REMOVAL  Risks, benefits and alternatives (for applicable procedures below) described. Performed By: NIDHI Funez. Indication:  Foreign body under the skin. Location:   Right heel.    Informed consent: Verbal consent obtained. The patient was counseled regarding the procedure in person, it's indications, risks, potential complications and alternatives and any questions were answered. Verbal consent was obtained. Prep: The skin was wiped with isopropyl alcohol and draped in a sterile fashion. Local Anesthesia:  obtained with Lidocaine 1% without epinephrine. Foreign Body was: removed using a scalpel and a hemostat and had the appearance of metal.  Small amount of serosanguineous fluid was expressed from area after scalpel insertion which wound culture was taken at this time. No purulent drainage noted. Ultrasound Guidance:   not used. Complications:  None. Patient tolerated the procedure well. MDM:   Imaging was obtained based on moderate suspicion for retained foreign bodyas per history/physical findings. X-ray imaging did note a 2 mm linear radiopaque foreign body subadjacent to the calcaneus as read by radiology. Results discussed with patient and all questions answered. Incision and drainage was performed as noted above. Patient tolerated well. Patient is up-to-date on tetanus and therefore this was not given. Due to the penetrating injury of the piece of metal patient was started on antibiotics. First dose of Levaquin given with an ED setting which was tolerated well. Patient sent prescription for utilization in the outpatient setting will follow-up with Dr. Shailesh Case whom is his orthopedic surgeon who performed debridement in the past.  Patient is without emergent findings that would warrant further evaluation or admit to the hospital at this time. He is alert and oriented, no acute distress, afebrile nontachycardic and nonhypoxic. Patient demonstrates no signs of streaking or cellulitic infection at this time. He is recommended to return to ER with new or worsening symptoms. Patient states he is understandable and agreeable to plan.     Plan of Care/Counseling:  NIDHI Bueno reviewed today's visit with the patient in addition to providing specific details for the plan of care and counseling regarding the diagnosis and prognosis. Questions are answered at this time and are agreeable with the plan. Assessment      1. Foreign body of right heel      Plan   Discharged home. Patient condition is good    New Medications     Discharge Medication List as of 11/3/2022 10:12 PM        START taking these medications    Details   levoFLOXacin (LEVAQUIN) 750 MG tablet Take 1 tablet by mouth daily for 7 days, Disp-7 tablet, R-0Normal           Electronically signed by NIDHI Conway   DD: 11/3/22  **This report was transcribed using voice recognition software. Every effort was made to ensure accuracy; however, inadvertent computerized transcription errors may be present.   END OF ED PROVIDER NOTE      Mallory Conway  11/04/22 [id number]

## 2022-11-03 NOTE — Clinical Note
Mickie Tate was seen and treated in our emergency department on 11/3/2022. He may return to work on 11/04/2022. If you have any questions or concerns, please don't hesitate to call.       NIDHI Leone

## 2022-11-03 NOTE — Clinical Note
Karin Gillis was seen and treated in our emergency department on 11/3/2022. He may return to work on 11/06/2022. If you have any questions or concerns, please don't hesitate to call.       NIDHI Vo

## 2022-11-07 LAB — WOUND/ABSCESS: NORMAL

## 2023-01-13 ENCOUNTER — APPOINTMENT (OUTPATIENT)
Dept: ULTRASOUND IMAGING | Age: 48
End: 2023-01-13
Payer: COMMERCIAL

## 2023-01-13 ENCOUNTER — APPOINTMENT (OUTPATIENT)
Dept: GENERAL RADIOLOGY | Age: 48
End: 2023-01-13
Payer: COMMERCIAL

## 2023-01-13 ENCOUNTER — HOSPITAL ENCOUNTER (EMERGENCY)
Age: 48
Discharge: HOME | End: 2023-01-13
Payer: COMMERCIAL

## 2023-01-13 VITALS
WEIGHT: 315 LBS | BODY MASS INDEX: 50.18 KG/M2 | OXYGEN SATURATION: 97 % | TEMPERATURE: 97.7 F | SYSTOLIC BLOOD PRESSURE: 227 MMHG | DIASTOLIC BLOOD PRESSURE: 118 MMHG | RESPIRATION RATE: 23 BRPM | HEART RATE: 88 BPM

## 2023-01-13 DIAGNOSIS — M25.462 KNEE EFFUSION, LEFT: Primary | ICD-10-CM

## 2023-01-13 DIAGNOSIS — M25.562 ACUTE PAIN OF LEFT KNEE: ICD-10-CM

## 2023-01-13 PROCEDURE — 6370000000 HC RX 637 (ALT 250 FOR IP): Performed by: PHYSICIAN ASSISTANT

## 2023-01-13 PROCEDURE — 99284 EMERGENCY DEPT VISIT MOD MDM: CPT

## 2023-01-13 PROCEDURE — 73562 X-RAY EXAM OF KNEE 3: CPT

## 2023-01-13 PROCEDURE — 93971 EXTREMITY STUDY: CPT

## 2023-01-13 RX ORDER — NAPROXEN 500 MG/1
500 TABLET ORAL 2 TIMES DAILY
Qty: 14 TABLET | Refills: 0 | Status: SHIPPED | OUTPATIENT
Start: 2023-01-13 | End: 2023-01-20

## 2023-01-13 RX ORDER — HYDROCODONE BITARTRATE AND ACETAMINOPHEN 5; 325 MG/1; MG/1
1 TABLET ORAL ONCE
Status: COMPLETED | OUTPATIENT
Start: 2023-01-13 | End: 2023-01-13

## 2023-01-13 RX ADMIN — HYDROCODONE BITARTRATE AND ACETAMINOPHEN 1 TABLET: 5; 325 TABLET ORAL at 15:09

## 2023-01-13 ASSESSMENT — PAIN - FUNCTIONAL ASSESSMENT: PAIN_FUNCTIONAL_ASSESSMENT: 0-10

## 2023-01-13 ASSESSMENT — LIFESTYLE VARIABLES: HOW OFTEN DO YOU HAVE A DRINK CONTAINING ALCOHOL: 4 OR MORE TIMES A WEEK

## 2023-01-13 ASSESSMENT — PAIN SCALES - GENERAL: PAINLEVEL_OUTOF10: 8

## 2023-01-13 NOTE — ED PROVIDER NOTES
Independent TY Visit. HPI:  1/13/23, Time: 3:06 PM BREANNA Nicholson is a 50 y.o. male presenting to the ED for left knee , leg pain , beginning 2 months ago, worse last 5 days   The complaint has been persistent, moderate in severity, and worsened by walking. Patient comes in with complaint of left knee leg pain and swelling. States he injured the knee approximately 2 months ago has had increasing swelling of the distal left lateral thigh now radiating down the calf to the foot. Patient denies any fever or chills. No chest pain or shortness of breath. Pains been worse over the last couple days with walking at work. Review of Systems:   A complete review of systems was performed and pertinent positives and negatives are stated within HPI, all other systems reviewed and are negative.          --------------------------------------------- PAST HISTORY ---------------------------------------------  Past Medical History:  has a past medical history of Hypertension. Past Surgical History:  has a past surgical history that includes Foot Amputation; Colonoscopy; and Foot Debridement (2-). Social History:  reports that he has been smoking. He has a 12.50 pack-year smoking history. He has never used smokeless tobacco. He reports current alcohol use. He reports that he does not use drugs. Family History: family history is not on file. The patients home medications have been reviewed. Allergies: Patient has no known allergies. -------------------------------------------------- RESULTS -------------------------------------------------  All laboratory and radiology results have been personally reviewed by myself   LABS:  No results found for this visit on 01/13/23. RADIOLOGY:  Interpreted by Radiologist.  US DUP LOWER EXTREMITY LEFT SHRAVAN   Final Result   No evidence of DVT in the left lower extremity.       RECOMMENDATIONS:   Unavailable         XR KNEE LEFT (3 VIEWS) Final Result   No fracture or dislocation. Small suprapatellar synovial effusion.             ------------------------- NURSING NOTES AND VITALS REVIEWED ---------------------------   The nursing notes within the ED encounter and vital signs as below have been reviewed. BP (!) 227/118   Pulse 88   Temp 97.7 °F (36.5 °C) (Infrared)   Resp 23   Wt (!) 370 lb (167.8 kg)   SpO2 97%   BMI 50.18 kg/m²   Oxygen Saturation Interpretation: Abnormal      ---------------------------------------------------PHYSICAL EXAM--------------------------------------      Constitutional/General: Alert and oriented x3, well appearing, non toxic in NAD  Head: Normocephalic and atraumatic  Eyes: PERRL, EOMI  Mouth: Oropharynx clear, handling secretions, no trismus  Neck: Supple, full ROM,   Pulmonary: Lungs clear to auscultation bilaterally, no wheezes, rales, or rhonchi. Not in respiratory distress  Cardiovascular:  Regular rate and rhythm, no murmurs, gallops, or rubs. 2+ distal pulses  Abdomen: Soft, non tender, non distended,   Extremities: Moves all extremities x 4. Warm and well perfused tenderness with swelling of the distal left upper leg tender lateral knee joint integrity intact tenderness of the left lower calf  Skin: warm and dry without rash  Neurologic: GCS 15,  Psych: Normal Affect      ------------------------------ ED COURSE/MEDICAL DECISION MAKING----------------------  Medications   HYDROcodone-acetaminophen (NORCO) 5-325 MG per tablet 1 tablet (1 tablet Oral Given 1/13/23 1509)       Medical Decision Making/ED COURSE:    History From: Patient      Patient is a 50 y.o. male presenting to the ED for left knee pain, leg swelling  onset 2 months ,  subacute moderate  in severity. In the ED, patient was Patient comes in with complaint of left knee leg pain and swelling.   States he injured the knee approximately 2 months ago has had increasing swelling of the distal left lateral thigh now radiating down the calf to the foot. Patient denies any fever or chills. No chest pain or shortness of breath. No lightheaded dizziness  pains been worse over the last couple days with walking at work. . On exam, swelling distal left upper leg tenderness lateral knee. Patient administered Norco 5 mg p.o. Left knee xray interpreted by me. Interpretation-effusion chronic changes no fracture to the foot. Radiologist confirms read. Ultrasound of the left lower leg reviewed by me    CONSULTS: (Who and What was discussed)  None      Social Determinants of Health : None    Chronic Conditions affecting care:    has a past medical history of Hypertension. Records Reviewed( Source)  [facility]     Differential diagnosis knee strain knee fracture dislocation knee effusion DVT    Reassessment: Patient had improvement in pain was placed in a Ace wrap    Disposition Considerations (Tests not ordered but considered, Shared Decision Making, Pt Expectation of Test or Tx.): Discussed with patient finding on x-ray ultrasound. No DVT present. Suprapatellar effusion present he was placed in Ace wrap placed on anti-inflammatories he is to follow-up with his primary care. Symptoms persist follow-up    Patient remained hemodynamically stable throughout ED course. Medications   HYDROcodone-acetaminophen (NORCO) 5-325 MG per tablet 1 tablet (1 tablet Oral Given 1/13/23 1509)       Discharge Medication List as of 1/13/2023  5:09 PM                --------------------------------- IMPRESSION AND DISPOSITION ---------------------------------    IMPRESSION  1. Knee effusion, left    2. Acute pain of left knee        DISPOSITION  Disposition: Discharge to home  Patient condition is good     Counseling: The emergency provider has spoken with the patient and discussed todays results, in addition to providing specific details for the plan of care and counseling regarding the diagnosis and prognosis.   Questions are answered at this time and they are agreeable with the plan. NOTE: This report was transcribed using voice recognition software.  Every effort was made to ensure accuracy; however, inadvertent computerized transcription errors may be present      Mallory Goodson  01/13/23 [id number]

## 2023-01-27 ENCOUNTER — OFFICE VISIT (OUTPATIENT)
Dept: ORTHOPEDIC SURGERY | Age: 48
End: 2023-01-27
Payer: COMMERCIAL

## 2023-01-27 VITALS — BODY MASS INDEX: 42.66 KG/M2 | WEIGHT: 315 LBS | TEMPERATURE: 98 F | HEIGHT: 72 IN

## 2023-01-27 DIAGNOSIS — M25.562 ACUTE PAIN OF LEFT KNEE: Primary | ICD-10-CM

## 2023-01-27 DIAGNOSIS — M25.562 ACUTE PAIN OF LEFT KNEE: ICD-10-CM

## 2023-01-27 DIAGNOSIS — S83.402A SPRAIN OF COLLATERAL LIGAMENT OF LEFT KNEE, INITIAL ENCOUNTER: Primary | ICD-10-CM

## 2023-01-27 PROCEDURE — 99203 OFFICE O/P NEW LOW 30 MIN: CPT | Performed by: ORTHOPAEDIC SURGERY

## 2023-01-27 NOTE — PROGRESS NOTES
Fer Pang is a 50 y.o. male, who presents   Chief Complaint   Patient presents with    Knee Pain     Left knee pain seen last seen in may got better and within the past 3 weeks has gotten worse. Wants to get MRI       HPI[de-identified] Mr. Marciano Liang presents with left knee pain which has been present for 3 weeks or more. He is moving heavy equipment apparently at work and experience pain. He does have a history of pain in his knee in 2022. He had been seen here for that. On this occasion he did go to emergency room where he was evaluated including x-rays on 1/13/2023. He comes in for follow-up of that and further evaluation as indicated. He has pain in the medial and infrapatellar areas. Has had swelling which has been consistently worse while is crepitus. This is decreased his activity made him limp as well. There is been no targeted treatment for this. Allergies; medications; past medical, surgical, family, and social history; and problem list have been reviewed today and updated as indicated in this encounter - see below following Ortho specifics. Musculoskeletal: Skin condition gross neurovascular functions good in left lower extremity. Gait is associated with a limp protecting the left lower extremity. His gait is slow. Single-leg stance was not tested because of that. Hip and ankle range of motion stability are good without pain. Left knee range of motion is limited in extension by about 5 degrees by pain. Active extension is more difficult with the patient only reaching about 20 degrees. Lection is 210 degrees without significant discomfort. There is effusion in the knee and crepitus throughout the range of motion. Collateral cruciate ligaments are grossly stable. There is some guarding. Patellofemoral alignment is good.   Penny testing does cause pain in the medial aspect as does stress examination and palpation along the medial joint line which causes an immediate withdrawal reaction. Radiologic Studies: Imaging studies 1/13/2023 were nonweightbearing but do still show some effusion and minimal medial joint space narrowing. ASSESSMENT:  Bartolo Garcia was seen today for knee pain. Diagnoses and all orders for this visit:    Acute pain of left knee     Treatment alternatives were reviewed including medical and physical therapies, injections, and surgical options, expected risks benefits and likely outcome of each were discussed in detail, questions asked and answered and understood. We discussed symptoms as well as physical findings and imaging results. Exam is suggestive of soft tissue injury and to strongly suggest medial meniscus pathology. This would best be evaluated by MRI scan. PLAN: We will seek approval for MRI scan of the left knee and proceed accordingly. There is no problem list on file for this patient. Past Medical History:   Diagnosis Date    Hypertension     not on meds       Past Surgical History:   Procedure Laterality Date    COLONOSCOPY      FOOT AMPUTATION      right foot    FOOT DEBRIDEMENT  2-    right foot stump       Current Outpatient Medications   Medication Sig Dispense Refill    amLODIPine (NORVASC) 5 MG tablet Take 5 mg by mouth daily      naproxen (NAPROSYN) 500 MG tablet Take 1 tablet by mouth 2 times daily for 7 days 14 tablet 0    ascorbic acid (VITAMIN C) 500 MG tablet Take 1 tablet by mouth 2 times daily for 7 days 14 tablet 0    zinc sulfate (ZINCATE) 220 (50 Zn) MG capsule Take 1 capsule by mouth daily for 7 days 7 capsule 0     No current facility-administered medications for this visit.        No Known Allergies    Social History     Socioeconomic History    Marital status: Single     Spouse name: None    Number of children: None    Years of education: None    Highest education level: None   Tobacco Use    Smoking status: Every Day     Packs/day: 0.50     Years: 25.00     Pack years: 12.50     Types: Cigarettes Smokeless tobacco: Never   Vaping Use    Vaping Use: Never used   Substance and Sexual Activity    Alcohol use: Yes     Comment: social    Drug use: No       History reviewed. No pertinent family history. Review of Systems:   As follows except as previously noted in HPI:  Constitutional: Negative for chills, diaphoresis,  fever   Respiratory: Negative for cough, shortness of breath and wheezing. Cardiovascular: Negative for chest pain and palpitations. Neurological: Negative for dizziness, syncope,   GI / : abdominal pain or cramping  Musculoskeletal: see HPI       Objective:   Physical Exam   Constitutional: Oriented to person, place, and time. and appears well-developed and well-nourished. :   Head: Normocephalic and atraumatic. Neck: Neck supple. Eyes: EOM are normal.   Pulmonary/Chest: Effort normal.  No respiratory distress, no wheezes. Neurological: Alert and oriented to person  Skin: Skin is warm and dry. Kim Singh, DO    1/27/23  9:41 AM    All reasonable efforts have been made to minimize the risk of errors that may occur in the use of voice recognition and other electronic means of charting.

## 2023-02-27 ENCOUNTER — HOSPITAL ENCOUNTER (OUTPATIENT)
Dept: MRI IMAGING | Age: 48
Discharge: HOME | End: 2023-03-01
Payer: COMMERCIAL

## 2023-02-27 DIAGNOSIS — M25.562 ACUTE PAIN OF LEFT KNEE: ICD-10-CM

## 2023-02-27 DIAGNOSIS — S83.402A SPRAIN OF COLLATERAL LIGAMENT OF LEFT KNEE, INITIAL ENCOUNTER: ICD-10-CM

## 2023-02-27 PROCEDURE — 73721 MRI JNT OF LWR EXTRE W/O DYE: CPT

## 2023-03-06 ENCOUNTER — HOSPITAL ENCOUNTER (EMERGENCY)
Age: 48
Discharge: HOME | End: 2023-03-06
Payer: COMMERCIAL

## 2023-03-06 VITALS
HEART RATE: 96 BPM | DIASTOLIC BLOOD PRESSURE: 120 MMHG | WEIGHT: 311 LBS | SYSTOLIC BLOOD PRESSURE: 169 MMHG | TEMPERATURE: 98.5 F | OXYGEN SATURATION: 97 % | BODY MASS INDEX: 42.18 KG/M2 | RESPIRATION RATE: 21 BRPM

## 2023-03-06 DIAGNOSIS — J01.10 ACUTE FRONTAL SINUSITIS, RECURRENCE NOT SPECIFIED: Primary | ICD-10-CM

## 2023-03-06 DIAGNOSIS — I10 ESSENTIAL HYPERTENSION: ICD-10-CM

## 2023-03-06 DIAGNOSIS — J06.9 ACUTE UPPER RESPIRATORY INFECTION: ICD-10-CM

## 2023-03-06 PROCEDURE — 99283 EMERGENCY DEPT VISIT LOW MDM: CPT

## 2023-03-06 RX ORDER — AMOXICILLIN AND CLAVULANATE POTASSIUM 875; 125 MG/1; MG/1
1 TABLET, FILM COATED ORAL 2 TIMES DAILY
Qty: 14 TABLET | Refills: 0 | Status: SHIPPED | OUTPATIENT
Start: 2023-03-06 | End: 2023-03-13

## 2023-03-06 RX ORDER — ALBUTEROL SULFATE 90 UG/1
2 AEROSOL, METERED RESPIRATORY (INHALATION) 4 TIMES DAILY PRN
Qty: 18 G | Refills: 0 | Status: SHIPPED | OUTPATIENT
Start: 2023-03-06

## 2023-03-06 ASSESSMENT — PAIN SCALES - GENERAL: PAINLEVEL_OUTOF10: 10

## 2023-03-06 ASSESSMENT — LIFESTYLE VARIABLES: HOW OFTEN DO YOU HAVE A DRINK CONTAINING ALCOHOL: 4 OR MORE TIMES A WEEK

## 2023-03-06 ASSESSMENT — PAIN DESCRIPTION - LOCATION: LOCATION: HEAD;EAR

## 2023-03-06 ASSESSMENT — PAIN - FUNCTIONAL ASSESSMENT: PAIN_FUNCTIONAL_ASSESSMENT: 0-10

## 2023-03-06 ASSESSMENT — PAIN DESCRIPTION - ORIENTATION: ORIENTATION: RIGHT;LEFT

## 2023-03-06 NOTE — ED PROVIDER NOTES
Independent TY Visit. [address] [facility]  Department of Emergency Medicine   ED  Encounter Note  Admit Date/RoomTime: 3/6/2023  2:48 PM  ED Room: [place] [id number]    NAME: Ghazal Fowler  : 1975  MRN: [id number]     Chief Complaint:  Sinusitis and Otalgia (X 4 DAYS)    History of Present Illness       Ghazal Fowler is a 50 y.o. old male who presents to the emergency department by private vehicle, for facial pressure, nasal congestion, slight cough for 4 days. Patient also complains of bilateral ear pain. He denies any chest pain or shortness of breath. Patient denies any vomiting or diarrhea. No recent sick contacts. Nothing make his symptoms better or worse. ROS   Pertinent positives and negatives are stated within HPI, all other systems reviewed and are negative. Past Medical History:  has a past medical history of Hypertension. Surgical History:  has a past surgical history that includes Foot Amputation; Colonoscopy; and Foot Debridement (2012). Social History:  reports that he has been smoking. He has a 12.50 pack-year smoking history. He has never used smokeless tobacco. He reports current alcohol use. He reports that he does not use drugs. Family History: family history is not on file. Allergies: Patient has no known allergies. Physical Exam   Oxygen Saturation Interpretation: Normal on room air analysis. ED Triage Vitals   BP Temp Temp src Heart Rate Resp SpO2 Height Weight   23 1354 23 1328 -- 23 1328 23 1459 23 1328 -- 23 1352   (!) 211/105 98.5 °F (36.9 °C)  (!) 124 21 99 %  (!) 311 lb (141.1 kg)         Constitutional:  Alert, development consistent with age. Ears:  External Ears: Bilateral normal.               TM's & External Canals: normal TM's and external ear canals bilaterally. Nose:   There is no discharge, swelling or lesions noted. Sinuses: moderate Bilateral maxillary sinus tenderness. moderate Bilateral frontal sinus tenderness. Mouth:  normal tongue and buccal mucosa. Throat: no erythema or exudates noted. Teeth and gums normal..  Airway Patent. Uvula is midline. Patient is swallowing without difficulty. Neck:  Supple. No meningeal signs. There is no  anterior cervical node tenderness. Respiratory:   Breath sounds: Bilateral normal.  Lung sounds: scattered wheezing to the right lung field. No other wheezing, rales or rhonchi. No respiratory distress. CV:  Regular rate and rhythm, normal heart sounds, without pathological murmurs, ectopy, gallops, or rubs. GI:  Abdomen Soft, nontender, good bowel sounds. No firm or pulsatile mass. Integument:  Normal turgor. Warm, dry, without visible rash. Neurological:  Oriented. Motor functions intact. Lab / Imaging Results   (All laboratory and radiology results have been personally reviewed by myself)  Labs:  No results found for this visit on 03/06/23. Imaging: All Radiology results interpreted by Radiologist unless otherwise noted. No orders to display       ED Course / Medical Decision Making   Medications - No data to display         Consults:   None    Procedures:   none    Medical Decision Making:   Girma Hays is a 50 y.o. old male who presents to the emergency department by private vehicle, for facial pressure, nasal congestion, slight cough for 4 days. Patient also complains of bilateral ear pain. He denies any chest pain or shortness of breath. Patient denies any vomiting or diarrhea. No recent sick contacts. Nothing make his symptoms better or worse. Differential diagnosis includes sinusitis, strep pharyngitis, COVID, influenza, pneumonia, bronchitis, upper respiratory infection. Patient declines to have any further testing done here today. He declines to have an x-ray done, breathing treatment, COVID or influenza testing. Patient is willing to try an albuterol inhaler for his scattered wheezing.   He is not hypoxic, he has no respiratory distress. Tachycardia resolved without any intervention here today. Patient states that Augmentin has helped him with his sinusitis in the past and request that here today. This will be refilled for him today. Patient is also updated that his blood pressure is high today. He states that it is always high, he is unsure if he is supposed to be on any blood pressure medications, states that he does not currently have a PCP. Patient advised that he should follow-up with a PCP for further evaluation and management of this. He is agreeable. Patient encouraged to return to the ER for any worsening symptoms    Assessment     1. Acute frontal sinusitis, recurrence not specified    2. Essential hypertension    3. Acute upper respiratory infection      Plan   Discharged home. Patient condition is good    New Medications     New Prescriptions    ALBUTEROL SULFATE HFA (VENTOLIN HFA) 108 (90 BASE) MCG/ACT INHALER    Inhale 2 puffs into the lungs 4 times daily as needed for Wheezing    AMOXICILLIN-CLAVULANATE (AUGMENTIN) 875-125 MG PER TABLET    Take 1 tablet by mouth 2 times daily for 7 days     Electronically signed by NIDHI Hdez   DD: 3/6/23  **This report was transcribed using voice recognition software. Every effort was made to ensure accuracy; however, inadvertent computerized transcription errors may be present.   END OF ED PROVIDER NOTE         NIDHI Hdez  03/06/23 [id number]

## 2023-03-08 ENCOUNTER — OFFICE VISIT (OUTPATIENT)
Dept: ORTHOPEDIC SURGERY | Age: 48
End: 2023-03-08

## 2023-03-08 VITALS — WEIGHT: 311 LBS | HEIGHT: 72 IN | BODY MASS INDEX: 42.12 KG/M2 | TEMPERATURE: 98 F

## 2023-03-08 DIAGNOSIS — M94.262 CHONDROMALACIA, LEFT KNEE: ICD-10-CM

## 2023-03-08 DIAGNOSIS — S80.02XA CONTUSION OF LEFT KNEE, INITIAL ENCOUNTER: Primary | ICD-10-CM

## 2023-03-08 DIAGNOSIS — M23.322 DEGENERATIVE TEAR OF POSTERIOR HORN OF MEDIAL MENISCUS, LEFT: ICD-10-CM

## 2023-03-08 DIAGNOSIS — E66.01 OBESITY, CLASS III, BMI 40-49.9 (MORBID OBESITY) (HCC): ICD-10-CM

## 2023-03-08 NOTE — PROGRESS NOTES
Chief Complaint:   Chief Complaint   Patient presents with    Knee Pain     Left knee MRI follow up. Left knee still clicking and popping. Jose Hernández follows up to review his MRI. He continues to have left knee problems. He really wants something done with it. He is tired of the pain and having to deal with it. He had his right knee scoped when he was young and recalls no major problems with that. Allergies; medications; past medical, surgical, family, and social history; and problem list have been reviewed today and updated as indicated in this encounter seen below. Exam:    Left knee range of motion is limited in extension by about 5 degrees by pain. Active extension is more difficult with the patient only reaching about 20 degrees. Lection is 210 degrees without significant discomfort. There is effusion in the knee and crepitus throughout the range of motion. Collateral cruciate ligaments are grossly stable. There is some guarding. Patellofemoral alignment is good. Penny testing does cause pain in the medial aspect as does stress examination and palpation along the medial joint line which causes an immediate withdrawal reaction. Radiographs: MRI imaging shows marginal hypertrophic changes in the medial femoral and tibial condyles. Appears to be a little chondral thinning in the patellofemoral articulation and also on the medial side. There is truncation of the medial meniscus and suggestion of posterior horn tear. ASSESSMENT:    Moises Shearer was seen today for knee pain.     Diagnoses and all orders for this visit:    Contusion of left knee, initial encounter    Obesity, Class III, BMI 40-49.9 (morbid obesity) (HCC)    Degenerative tear of posterior horn of medial meniscus, left    Chondromalacia, left knee        PLAN: We discussed the findings in all who is so his physical symptoms and exam.  We discussed treatment in the form of arthroscopic treatment he would like to get this done to try to get rid of the pain is soon as possible.  The surgery, risk, prognosis, limitations and potential rehab issues were discussed in detail with parent good understanding.  He is a big man will most likely require medical clearance.  This could present as a problem since he has no primary care physician.    No follow-ups on file.       Current Outpatient Medications   Medication Sig Dispense Refill    amoxicillin-clavulanate (AUGMENTIN) 875-125 MG per tablet Take 1 tablet by mouth 2 times daily for 7 days 14 tablet 0    albuterol sulfate HFA (VENTOLIN HFA) 108 (90 Base) MCG/ACT inhaler Inhale 2 puffs into the lungs 4 times daily as needed for Wheezing 18 g 0    naproxen (NAPROSYN) 500 MG tablet Take 1 tablet by mouth 2 times daily for 7 days 14 tablet 0    amLODIPine (NORVASC) 5 MG tablet Take 5 mg by mouth daily      ascorbic acid (VITAMIN C) 500 MG tablet Take 1 tablet by mouth 2 times daily for 7 days 14 tablet 0    zinc sulfate (ZINCATE) 220 (50 Zn) MG capsule Take 1 capsule by mouth daily for 7 days 7 capsule 0     No current facility-administered medications for this visit.       There is no problem list on file for this patient.      Past Medical History:   Diagnosis Date    Hypertension     not on meds       Past Surgical History:   Procedure Laterality Date    COLONOSCOPY      FOOT AMPUTATION      right foot    FOOT DEBRIDEMENT  2-    right foot stump       No Known Allergies    Social History     Socioeconomic History    Marital status: Single     Spouse name: None    Number of children: None    Years of education: None    Highest education level: None   Tobacco Use    Smoking status: Every Day     Packs/day: 0.50     Years: 25.00     Pack years: 12.50     Types: Cigarettes    Smokeless tobacco: Never   Vaping Use    Vaping Use: Never used   Substance and Sexual Activity    Alcohol use: Yes     Comment: social    Drug use: No       Review of Systems  As follows except as previously  noted in HPI:  Constitutional: Negative for chills, diaphoresis, fatigue, fever and unexpected weight change. Respiratory: Negative for cough, shortness of breath and wheezing. Cardiovascular: Negative for chest pain and palpitations. Neurological: Negative for dizziness, syncope, cephalgia. GI / : negative  Musculoskeletal: see HPI       Objective:   Physical Exam   Constitutional: Oriented to person, place, and time. and appears well-developed and well-nourished. :   Head: Normocephalic and atraumatic. Eyes: EOM are normal.   Neck: Neck supple. Cardiovascular: Normal rate and regular rhythm. Pulmonary/Chest: Effort normal. No stridor. No respiratory distress, no wheezes. Abdominal:  No abnormal distension. Neurological: Alert and oriented to person, place, and time. Skin: Skin is warm and dry. Psychiatric: Normal mood and affect.  Behavior is normal. Thought content normal.    GIDEON In[place], DO    3/8/23  2:02 PM

## 2023-03-15 ENCOUNTER — OFFICE VISIT (OUTPATIENT)
Dept: FAMILY MEDICINE CLINIC | Age: 48
End: 2023-03-15

## 2023-03-15 VITALS
TEMPERATURE: 96.9 F | OXYGEN SATURATION: 96 % | BODY MASS INDEX: 42.66 KG/M2 | WEIGHT: 315 LBS | SYSTOLIC BLOOD PRESSURE: 165 MMHG | HEART RATE: 86 BPM | HEIGHT: 72 IN | DIASTOLIC BLOOD PRESSURE: 106 MMHG | RESPIRATION RATE: 23 BRPM

## 2023-03-15 DIAGNOSIS — Z13.220 SCREENING, LIPID: ICD-10-CM

## 2023-03-15 DIAGNOSIS — I10 PRIMARY HYPERTENSION: Primary | ICD-10-CM

## 2023-03-15 DIAGNOSIS — Z11.59 ENCOUNTER FOR HEPATITIS C SCREENING TEST FOR LOW RISK PATIENT: ICD-10-CM

## 2023-03-15 DIAGNOSIS — Z11.4 SCREENING FOR HIV WITHOUT PRESENCE OF RISK FACTORS: ICD-10-CM

## 2023-03-15 DIAGNOSIS — E66.01 CLASS 3 SEVERE OBESITY DUE TO EXCESS CALORIES WITHOUT SERIOUS COMORBIDITY WITH BODY MASS INDEX (BMI) OF 40.0 TO 44.9 IN ADULT (HCC): ICD-10-CM

## 2023-03-15 DIAGNOSIS — Z13.1 SCREENING FOR DIABETES MELLITUS (DM): ICD-10-CM

## 2023-03-15 DIAGNOSIS — G89.29 CHRONIC PAIN OF LEFT KNEE: ICD-10-CM

## 2023-03-15 DIAGNOSIS — M25.562 CHRONIC PAIN OF LEFT KNEE: ICD-10-CM

## 2023-03-15 LAB
ALBUMIN SERPL-MCNC: 4.4 G/DL (ref 3.5–5.2)
ALP SERPL-CCNC: 88 U/L (ref 40–129)
ALT SERPL-CCNC: 22 U/L (ref 0–40)
ANION GAP SERPL CALCULATED.3IONS-SCNC: 10 MMOL/L (ref 7–16)
AST SERPL-CCNC: 20 U/L (ref 0–39)
BILIRUB SERPL-MCNC: 0.3 MG/DL (ref 0–1.2)
BUN SERPL-MCNC: 9 MG/DL (ref 6–20)
CALCIUM SERPL-MCNC: 9.1 MG/DL (ref 8.6–10.2)
CHLORIDE SERPL-SCNC: 101 MMOL/L (ref 98–107)
CHOLESTEROL, TOTAL: 185 MG/DL (ref 0–199)
CO2 SERPL-SCNC: 29 MMOL/L (ref 22–29)
CREAT SERPL-MCNC: 1.2 MG/DL (ref 0.7–1.2)
CREATININE URINE POCT: NORMAL
GLUCOSE SERPL-MCNC: 91 MG/DL (ref 74–99)
HBA1C MFR BLD: 5.9 % (ref 4–5.6)
HDLC SERPL-MCNC: 39 MG/DL
LDLC SERPL CALC-MCNC: 114 MG/DL (ref 0–99)
MICROALBUMIN/CREAT 24H UR: NORMAL MG/G{CREAT}
MICROALBUMIN/CREAT UR-RTO: NORMAL
POTASSIUM SERPL-SCNC: 4.5 MMOL/L (ref 3.5–5)
PROT SERPL-MCNC: 7 G/DL (ref 6.4–8.3)
SODIUM SERPL-SCNC: 140 MMOL/L (ref 132–146)
TRIGL SERPL-MCNC: 158 MG/DL (ref 0–149)
VLDLC SERPL CALC-MCNC: 32 MG/DL

## 2023-03-15 RX ORDER — AMLODIPINE BESYLATE 5 MG/1
5 TABLET ORAL DAILY
Qty: 30 TABLET | Refills: 3 | Status: SHIPPED | OUTPATIENT
Start: 2023-03-15

## 2023-03-15 RX ORDER — CLONIDINE HYDROCHLORIDE 0.1 MG/1
0.1 TABLET ORAL ONCE
Status: COMPLETED | OUTPATIENT
Start: 2023-03-15 | End: 2023-03-15

## 2023-03-15 RX ADMIN — CLONIDINE HYDROCHLORIDE 0.1 MG: 0.1 TABLET ORAL at 15:39

## 2023-03-15 ASSESSMENT — PATIENT HEALTH QUESTIONNAIRE - PHQ9
2. FEELING DOWN, DEPRESSED OR HOPELESS: 0
SUM OF ALL RESPONSES TO PHQ QUESTIONS 1-9: 0
SUM OF ALL RESPONSES TO PHQ QUESTIONS 1-9: 0
1. LITTLE INTEREST OR PLEASURE IN DOING THINGS: 0
SUM OF ALL RESPONSES TO PHQ QUESTIONS 1-9: 0
SUM OF ALL RESPONSES TO PHQ QUESTIONS 1-9: 0
SUM OF ALL RESPONSES TO PHQ9 QUESTIONS 1 & 2: 0

## 2023-03-15 NOTE — PATIENT INSTRUCTIONS
You may take Tylenol 325 mg 2 tablets and Motrin 200 mg 2 tablets together every 6 hours as needed for pain.

## 2023-03-15 NOTE — PROGRESS NOTES
3/18/2023    Elinor Allison    Chief Complaint   Patient presents with    New Patient     Pt is having (LT)knee surgery . Pt hasn't seen Doc in 25 years     Hypertension       HPI  History was obtained from patient. Junie Gan is a 50 y.o. male who presents today with the followin. Primary hypertension    2. Screening, lipid    3. Screening for diabetes mellitus (DM)    4. Encounter for hepatitis C screening test for low risk patient    5. Screening for HIV without presence of risk factors    6. Class 3 severe obesity due to excess calories without serious comorbidity with body mass index (BMI) of 40.0 to 44.9 in adult [facility])    Patient presents to establish primary care. Patient has not seen a primary care physician in 25 years. He knows he has high blood pressure but has not taken care of this. Patient wants to have surgery done on his left knee but needs to be established and get his blood pressure control before that can even be considered. Patient has chronic left knee pain. He has seen orthopedics recently and has been recommended to have arthroscopic surgery. REVIEW OF SYMPTOMS    Review of Systems   Constitutional:  Negative for chills, fatigue and fever. HENT:  Negative for congestion, mouth sores, postnasal drip, rhinorrhea and sinus pain. Eyes:  Negative for photophobia, discharge and redness. Respiratory:  Negative for cough and shortness of breath. Cardiovascular:  Negative for chest pain. Gastrointestinal: Negative. Genitourinary:  Negative for difficulty urinating, dysuria, hematuria and urgency. Musculoskeletal:  Positive for arthralgias (Left knee). Neurological:  Negative for headaches. Psychiatric/Behavioral:  Negative for sleep disturbance. PAST MEDICAL HISTORY  Past Medical History:   Diagnosis Date    Hypertension     not on meds       FAMILY HISTORY  No family history on file.     SOCIAL HISTORY  Social History     Socioeconomic History    Marital status: Single     Spouse name: None    Number of children: None    Years of education: None    Highest education level: None   Tobacco Use    Smoking status: Every Day     Packs/day: 0.50     Years: 25.00     Pack years: 12.50     Types: Cigarettes    Smokeless tobacco: Never   Vaping Use    Vaping Use: Never used   Substance and Sexual Activity    Alcohol use: Yes     Comment: social    Drug use: No        SURGICAL HISTORY  Past Surgical History:   Procedure Laterality Date    COLONOSCOPY      FOOT AMPUTATION      right foot    FOOT DEBRIDEMENT  2-    right foot stump                 CURRENT MEDICATIONS  Current Outpatient Medications   Medication Sig Dispense Refill    amLODIPine (NORVASC) 5 MG tablet Take 1 tablet by mouth daily 30 tablet 3    albuterol sulfate HFA (VENTOLIN HFA) 108 (90 Base) MCG/ACT inhaler Inhale 2 puffs into the lungs 4 times daily as needed for Wheezing 18 g 0    naproxen (NAPROSYN) 500 MG tablet Take 1 tablet by mouth 2 times daily for 7 days 14 tablet 0     No current facility-administered medications for this visit.       ALLERGIES  No Known Allergies    PHYSICAL EXAM    BP (!) 165/106   Pulse 86   Temp 96.9 °F (36.1 °C)   Resp 23   Ht 6' (1.829 m)   Wt (!) 316 lb 6.4 oz (143.5 kg)   SpO2 96%   BMI 42.91 kg/m²     Physical Exam  Vitals and nursing note reviewed.   Constitutional:       Appearance: Normal appearance.   HENT:      Head: Normocephalic and atraumatic.      Nose: No congestion or rhinorrhea.      Mouth/Throat:      Mouth: Mucous membranes are moist.      Pharynx: Oropharynx is clear.   Eyes:      General: No scleral icterus.     Conjunctiva/sclera: Conjunctivae normal.   Cardiovascular:      Rate and Rhythm: Normal rate and regular rhythm.      Heart sounds: Normal heart sounds.   Pulmonary:      Effort: Pulmonary effort is normal.      Breath sounds: Normal breath sounds.   Abdominal:      Palpations: Abdomen is soft.      Tenderness: There is no abdominal tenderness.  Musculoskeletal:      Cervical back: Neck supple. Skin:     General: Skin is warm. Neurological:      Mental Status: He is alert and oriented to person, place, and time. Psychiatric:         Mood and Affect: Mood normal.       ASSESSMENT & Travon Londono was seen today for new patient and hypertension. Diagnoses and all orders for this visit:    Primary hypertension  -     cloNIDine (CATAPRES) tablet 0.1 mg  -     EKG 12 Lead - Clinic Performed; Future  -     EKG 12 Lead - Clinic Performed  -     TSH; Future  -     amLODIPine (NORVASC) 5 MG tablet; Take 1 tablet by mouth daily  Patient was given clonidine 0.1 mg in the office and his blood pressure decreased from 197/119 down to 165/106. Patient has been prescribed amlodipine and will follow-up in 1 week. KG done in the office today shows a sinus rhythm with left anterior fascicular block. Screening, lipid  -     LIPID PANEL; Future    Screening for diabetes mellitus (DM)  -     Hemoglobin A1C; Future  -     Comprehensive Metabolic Panel; Future  -     MICROALBUMIN, UR; Future  -     POCT microalbumin  Patient's hemoglobin will be checked along with metabolic panel. Encounter for hepatitis C screening test for low risk patient  -     Hepatitis C Antibody; Future    Screening for HIV without presence of risk factors  -     HIV Screen; Future    Class 3 severe obesity due to excess calories without serious comorbidity with body mass index (BMI) of 40.0 to 44.9 in adult [facility])    Patient is aware that he needs to lose weight but has been heavy all his life. Will consider referral to bariatric center for medical treatment of his obesity in the future. More than 40 minutes with the patient today with exam testing and discussion of EKG findings and medication administration to get his blood pressure into a safe level. And the need for weight loss. Return in about 1 week (around 3/22/2023). Electronically signed by Bailey Machado.  Miladis Gutierrez DO on 3/18/2023

## 2023-03-18 PROBLEM — I10 PRIMARY HYPERTENSION: Status: ACTIVE | Noted: 2023-03-18

## 2023-03-18 PROBLEM — G89.29 CHRONIC PAIN OF LEFT KNEE: Status: ACTIVE | Noted: 2023-03-18

## 2023-03-18 PROBLEM — M25.562 CHRONIC PAIN OF LEFT KNEE: Status: ACTIVE | Noted: 2023-03-18

## 2023-03-18 PROBLEM — E66.01 CLASS 3 SEVERE OBESITY DUE TO EXCESS CALORIES WITHOUT SERIOUS COMORBIDITY WITH BODY MASS INDEX (BMI) OF 40.0 TO 44.9 IN ADULT (HCC): Status: ACTIVE | Noted: 2023-03-18

## 2023-03-18 PROBLEM — E66.813 CLASS 3 SEVERE OBESITY DUE TO EXCESS CALORIES WITHOUT SERIOUS COMORBIDITY WITH BODY MASS INDEX (BMI) OF 40.0 TO 44.9 IN ADULT: Status: ACTIVE | Noted: 2023-03-18

## 2023-03-18 ASSESSMENT — ENCOUNTER SYMPTOMS
GASTROINTESTINAL NEGATIVE: 1
EYE REDNESS: 0
RHINORRHEA: 0
EYE DISCHARGE: 0
SINUS PAIN: 0
PHOTOPHOBIA: 0
COUGH: 0
SHORTNESS OF BREATH: 0

## 2023-03-21 LAB — HIV1+2 AB SERPL QL IA: NORMAL

## 2023-03-22 LAB — HCV AB SERPL QL IA: NORMAL

## 2023-12-29 ENCOUNTER — TELEPHONE (OUTPATIENT)
Dept: FAMILY MEDICINE CLINIC | Age: 48
End: 2023-12-29

## 2024-01-22 ENCOUNTER — OFFICE VISIT (OUTPATIENT)
Dept: FAMILY MEDICINE CLINIC | Age: 49
End: 2024-01-22
Payer: COMMERCIAL

## 2024-01-22 VITALS — HEIGHT: 72 IN | BODY MASS INDEX: 42.66 KG/M2 | WEIGHT: 315 LBS | TEMPERATURE: 98.1 F

## 2024-01-22 DIAGNOSIS — G47.10 HYPERSOMNOLENCE: Primary | ICD-10-CM

## 2024-01-22 DIAGNOSIS — E66.01 CLASS 3 SEVERE OBESITY DUE TO EXCESS CALORIES WITHOUT SERIOUS COMORBIDITY WITH BODY MASS INDEX (BMI) OF 40.0 TO 44.9 IN ADULT (HCC): ICD-10-CM

## 2024-01-22 DIAGNOSIS — I10 PRIMARY HYPERTENSION: ICD-10-CM

## 2024-01-22 DIAGNOSIS — R73.9 HYPERGLYCEMIA: ICD-10-CM

## 2024-01-22 PROCEDURE — 99214 OFFICE O/P EST MOD 30 MIN: CPT | Performed by: FAMILY MEDICINE

## 2024-01-22 RX ORDER — AMLODIPINE BESYLATE 5 MG/1
5 TABLET ORAL DAILY
Qty: 30 TABLET | Refills: 3 | Status: SHIPPED | OUTPATIENT
Start: 2024-01-22

## 2024-01-30 PROBLEM — G47.10 HYPERSOMNOLENCE: Status: ACTIVE | Noted: 2024-01-30

## 2024-01-30 ASSESSMENT — ENCOUNTER SYMPTOMS
SHORTNESS OF BREATH: 0
GASTROINTESTINAL NEGATIVE: 1
EYE REDNESS: 0
COUGH: 0
RHINORRHEA: 0
PHOTOPHOBIA: 0
SINUS PAIN: 0
EYE DISCHARGE: 0

## 2024-01-30 NOTE — PROGRESS NOTES
2024    Medardo Sandoval    Chief Complaint   Patient presents with    Follow-up     Test results     Hypertension     Pt hasn't taken blood pressure medication for months, states he ran out.        HPI  History was obtained from patient.  Medardo is a 49 y.o. male who presents today with the followin. Hypersomnolence    2. Primary hypertension    3. Hyperglycemia    4. Class 3 severe obesity due to excess calories without serious comorbidity with body mass index (BMI) of 40.0 to 44.9 in adult (HCC)    Patient presents for follow-up of hypertension hyperglycemia and obesity.  Patient was last seen in March of last year with uncontrolled hypertension.  He had to be given clonidine in the office to get his blood pressure down and was to follow-up in 1 week for follow-up of his blood pressure.  Patient no showed that appointment and did not reschedule until now.  Patient also complains of hypersomnolence.  Patient states that no matter how much sleep he gets he still feels sleepy throughout the day.  Patient has not been evaluated for this in the past.     REVIEW OF SYMPTOMS    Review of Systems   Constitutional:  Positive for fatigue. Negative for chills and fever.   HENT:  Negative for congestion, mouth sores, postnasal drip, rhinorrhea and sinus pain.    Eyes:  Negative for photophobia, discharge and redness.   Respiratory:  Negative for cough and shortness of breath.    Cardiovascular:  Negative for chest pain.   Gastrointestinal: Negative.    Genitourinary:  Negative for difficulty urinating, dysuria, hematuria and urgency.   Neurological:  Negative for headaches.   Psychiatric/Behavioral:  Negative for sleep disturbance.        PAST MEDICAL HISTORY  Past Medical History:   Diagnosis Date    Hypertension     not on meds       FAMILY HISTORY  History reviewed. No pertinent family history.    SOCIAL HISTORY  Social History     Socioeconomic History    Marital status: Single     Spouse name: None    Number

## 2024-02-19 ENCOUNTER — OFFICE VISIT (OUTPATIENT)
Dept: FAMILY MEDICINE CLINIC | Age: 49
End: 2024-02-19
Payer: COMMERCIAL

## 2024-02-19 VITALS
TEMPERATURE: 98 F | DIASTOLIC BLOOD PRESSURE: 100 MMHG | OXYGEN SATURATION: 99 % | SYSTOLIC BLOOD PRESSURE: 180 MMHG | BODY MASS INDEX: 42.66 KG/M2 | HEART RATE: 80 BPM | WEIGHT: 315 LBS | HEIGHT: 72 IN

## 2024-02-19 DIAGNOSIS — I10 PRIMARY HYPERTENSION: Primary | ICD-10-CM

## 2024-02-19 DIAGNOSIS — F17.210 CIGARETTE NICOTINE DEPENDENCE WITHOUT COMPLICATION: ICD-10-CM

## 2024-02-19 DIAGNOSIS — G47.10 HYPERSOMNOLENCE: ICD-10-CM

## 2024-02-19 DIAGNOSIS — E66.01 CLASS 3 SEVERE OBESITY DUE TO EXCESS CALORIES WITHOUT SERIOUS COMORBIDITY WITH BODY MASS INDEX (BMI) OF 40.0 TO 44.9 IN ADULT (HCC): ICD-10-CM

## 2024-02-19 PROCEDURE — 3077F SYST BP >= 140 MM HG: CPT | Performed by: FAMILY MEDICINE

## 2024-02-19 PROCEDURE — 3080F DIAST BP >= 90 MM HG: CPT | Performed by: FAMILY MEDICINE

## 2024-02-19 PROCEDURE — 99213 OFFICE O/P EST LOW 20 MIN: CPT | Performed by: FAMILY MEDICINE

## 2024-02-19 RX ORDER — AMLODIPINE BESYLATE 10 MG/1
10 TABLET ORAL DAILY
Qty: 30 TABLET | Refills: 1 | Status: SHIPPED
Start: 2024-02-19 | End: 2024-03-04 | Stop reason: SDUPTHER

## 2024-02-19 NOTE — PROGRESS NOTES
Comment: social    Drug use: No     Social Determinants of Health     Financial Resource Strain: Low Risk  (8/16/2021)    Overall Financial Resource Strain (CARDIA)     Difficulty of Paying Living Expenses: Not hard at all   Food Insecurity: No Food Insecurity (8/16/2021)    Hunger Vital Sign     Worried About Running Out of Food in the Last Year: Never true     Ran Out of Food in the Last Year: Never true        SURGICAL HISTORY  Past Surgical History:   Procedure Laterality Date    COLONOSCOPY      FOOT AMPUTATION      right foot    FOOT DEBRIDEMENT  2-    right foot stump                 CURRENT MEDICATIONS  Current Outpatient Medications   Medication Sig Dispense Refill    amLODIPine (NORVASC) 10 MG tablet Take 1 tablet by mouth daily 30 tablet 1    naproxen (NAPROSYN) 500 MG tablet Take 1 tablet by mouth 2 times daily for 7 days 14 tablet 0     No current facility-administered medications for this visit.       ALLERGIES  No Known Allergies    PHYSICAL EXAM    BP (!) 180/100   Pulse 80   Temp 98 °F (36.7 °C)   Ht 1.829 m (6')   Wt (!) 146.1 kg (322 lb)   SpO2 99%   BMI 43.67 kg/m²     Physical Exam  Vitals and nursing note reviewed.   Constitutional:       Appearance: Normal appearance. He is morbidly obese.   Eyes:      General: No scleral icterus.     Conjunctiva/sclera: Conjunctivae normal.   Neurological:      Mental Status: He is alert and oriented to person, place, and time.   Psychiatric:         Mood and Affect: Mood normal.         ASSESSMENT & PLAN    Medardo was seen today for hypertension.    Diagnoses and all orders for this visit:    Primary hypertension  -     amLODIPine (NORVASC) 10 MG tablet; Take 1 tablet by mouth daily  Patient's amlodipine has been increased from 5 mg to 10 mg daily.    Class 3 severe obesity due to excess calories without serious comorbidity with body mass index (BMI) of 40.0 to 44.9 in adult (HCC)    Hypersomnolence  Patient has pending referral to sleep

## 2024-03-04 ENCOUNTER — OFFICE VISIT (OUTPATIENT)
Dept: FAMILY MEDICINE CLINIC | Age: 49
End: 2024-03-04
Payer: COMMERCIAL

## 2024-03-04 VITALS
WEIGHT: 315 LBS | TEMPERATURE: 97.1 F | HEIGHT: 72 IN | OXYGEN SATURATION: 97 % | SYSTOLIC BLOOD PRESSURE: 148 MMHG | HEART RATE: 86 BPM | BODY MASS INDEX: 42.66 KG/M2 | DIASTOLIC BLOOD PRESSURE: 88 MMHG

## 2024-03-04 DIAGNOSIS — I10 PRIMARY HYPERTENSION: Primary | ICD-10-CM

## 2024-03-04 DIAGNOSIS — E66.01 CLASS 3 SEVERE OBESITY DUE TO EXCESS CALORIES WITHOUT SERIOUS COMORBIDITY WITH BODY MASS INDEX (BMI) OF 40.0 TO 44.9 IN ADULT (HCC): ICD-10-CM

## 2024-03-04 DIAGNOSIS — F17.210 CIGARETTE NICOTINE DEPENDENCE WITHOUT COMPLICATION: ICD-10-CM

## 2024-03-04 PROCEDURE — 3077F SYST BP >= 140 MM HG: CPT | Performed by: FAMILY MEDICINE

## 2024-03-04 PROCEDURE — 3079F DIAST BP 80-89 MM HG: CPT | Performed by: FAMILY MEDICINE

## 2024-03-04 PROCEDURE — 99213 OFFICE O/P EST LOW 20 MIN: CPT | Performed by: FAMILY MEDICINE

## 2024-03-04 RX ORDER — LOSARTAN POTASSIUM 25 MG/1
25 TABLET ORAL DAILY
Qty: 90 TABLET | Refills: 1 | Status: SHIPPED | OUTPATIENT
Start: 2024-03-04

## 2024-03-04 RX ORDER — AMLODIPINE BESYLATE 10 MG/1
10 TABLET ORAL DAILY
Qty: 90 TABLET | Refills: 1 | Status: SHIPPED | OUTPATIENT
Start: 2024-03-04

## 2024-03-04 ASSESSMENT — ENCOUNTER SYMPTOMS
EYE REDNESS: 0
SINUS PAIN: 0
COUGH: 0
SHORTNESS OF BREATH: 0
PHOTOPHOBIA: 0
EYE DISCHARGE: 0
GASTROINTESTINAL NEGATIVE: 1
RHINORRHEA: 0

## 2024-03-04 NOTE — PROGRESS NOTES
ready.    Class 3 severe obesity due to excess calories without serious comorbidity with body mass index (BMI) of 40.0 to 44.9 in adult (HCC)  Patient is reminded to eat smaller portions of food and avoid high-calorie items such as sweets and fried and fatty foods.      Return in about 4 weeks (around 4/1/2024).         Electronically signed by Geovanny Duran DO on 3/17/2024

## 2024-03-15 ENCOUNTER — COMMUNITY OUTREACH (OUTPATIENT)
Dept: FAMILY MEDICINE CLINIC | Age: 49
End: 2024-03-15

## 2024-11-27 DIAGNOSIS — I10 PRIMARY HYPERTENSION: ICD-10-CM

## 2024-11-27 RX ORDER — AMLODIPINE BESYLATE 10 MG/1
10 TABLET ORAL DAILY
Qty: 90 TABLET | Refills: 1 | OUTPATIENT
Start: 2024-11-27

## 2024-11-27 NOTE — TELEPHONE ENCOUNTER
Name of Medication(s) Requested:  amLODIPine (NORVASC) 10 MG     Medication is on current medication list Yes    Dosage and directions were verified? Yes    Quantity verified: 90 day supply     Pharmacy Verified?  Yes  [id number] -   [place], [place] - [address] [phone] - F [phone]     Last Appointment:  3/4/2024    Future appts:  No future appointments.     (If no appt send self scheduling link. .REFILLAPPT)  Scheduling request sent?     [] Yes  [x] No    Does patient need updated?  [] Yes  [x] No

## 2025-02-12 ENCOUNTER — OFFICE VISIT (OUTPATIENT)
Dept: FAMILY MEDICINE CLINIC | Age: 50
End: 2025-02-12
Payer: COMMERCIAL

## 2025-02-12 VITALS
TEMPERATURE: 97.7 F | SYSTOLIC BLOOD PRESSURE: 138 MMHG | WEIGHT: 315 LBS | DIASTOLIC BLOOD PRESSURE: 87 MMHG | BODY MASS INDEX: 44.35 KG/M2 | HEART RATE: 76 BPM

## 2025-02-12 DIAGNOSIS — Z13.220 SCREENING, LIPID: ICD-10-CM

## 2025-02-12 DIAGNOSIS — R73.9 HYPERGLYCEMIA: ICD-10-CM

## 2025-02-12 DIAGNOSIS — E66.01 CLASS 3 SEVERE OBESITY DUE TO EXCESS CALORIES WITHOUT SERIOUS COMORBIDITY WITH BODY MASS INDEX (BMI) OF 40.0 TO 44.9 IN ADULT: ICD-10-CM

## 2025-02-12 DIAGNOSIS — I10 PRIMARY HYPERTENSION: ICD-10-CM

## 2025-02-12 DIAGNOSIS — E66.813 CLASS 3 SEVERE OBESITY DUE TO EXCESS CALORIES WITHOUT SERIOUS COMORBIDITY WITH BODY MASS INDEX (BMI) OF 40.0 TO 44.9 IN ADULT: ICD-10-CM

## 2025-02-12 DIAGNOSIS — M54.16 LEFT LUMBAR RADICULOPATHY: Primary | ICD-10-CM

## 2025-02-12 PROCEDURE — G2211 COMPLEX E/M VISIT ADD ON: HCPCS | Performed by: FAMILY MEDICINE

## 2025-02-12 PROCEDURE — 3075F SYST BP GE 130 - 139MM HG: CPT | Performed by: FAMILY MEDICINE

## 2025-02-12 PROCEDURE — 99214 OFFICE O/P EST MOD 30 MIN: CPT | Performed by: FAMILY MEDICINE

## 2025-02-12 PROCEDURE — 3079F DIAST BP 80-89 MM HG: CPT | Performed by: FAMILY MEDICINE

## 2025-02-12 RX ORDER — LOSARTAN POTASSIUM 25 MG/1
25 TABLET ORAL DAILY
Qty: 90 TABLET | Refills: 1 | Status: SHIPPED | OUTPATIENT
Start: 2025-02-12

## 2025-02-12 RX ORDER — AMLODIPINE BESYLATE 10 MG/1
10 TABLET ORAL DAILY
Qty: 90 TABLET | Refills: 1 | Status: SHIPPED | OUTPATIENT
Start: 2025-02-12

## 2025-02-12 SDOH — ECONOMIC STABILITY: FOOD INSECURITY: WITHIN THE PAST 12 MONTHS, THE FOOD YOU BOUGHT JUST DIDN'T LAST AND YOU DIDN'T HAVE MONEY TO GET MORE.: NEVER TRUE

## 2025-02-12 SDOH — ECONOMIC STABILITY: FOOD INSECURITY: WITHIN THE PAST 12 MONTHS, YOU WORRIED THAT YOUR FOOD WOULD RUN OUT BEFORE YOU GOT MONEY TO BUY MORE.: NEVER TRUE

## 2025-02-12 ASSESSMENT — PATIENT HEALTH QUESTIONNAIRE - PHQ9
SUM OF ALL RESPONSES TO PHQ QUESTIONS 1-9: 0
SUM OF ALL RESPONSES TO PHQ QUESTIONS 1-9: 0
2. FEELING DOWN, DEPRESSED OR HOPELESS: NOT AT ALL
1. LITTLE INTEREST OR PLEASURE IN DOING THINGS: NOT AT ALL
SUM OF ALL RESPONSES TO PHQ QUESTIONS 1-9: 0
SUM OF ALL RESPONSES TO PHQ QUESTIONS 1-9: 0
SUM OF ALL RESPONSES TO PHQ9 QUESTIONS 1 & 2: 0

## 2025-02-19 ENCOUNTER — OFFICE VISIT (OUTPATIENT)
Dept: ORTHOPEDIC SURGERY | Age: 50
End: 2025-02-19
Payer: COMMERCIAL

## 2025-02-19 VITALS — HEIGHT: 72 IN | WEIGHT: 315 LBS | BODY MASS INDEX: 42.66 KG/M2

## 2025-02-19 DIAGNOSIS — M94.262 CHONDROMALACIA, LEFT KNEE: ICD-10-CM

## 2025-02-19 DIAGNOSIS — M23.322 DEGENERATIVE TEAR OF POSTERIOR HORN OF MEDIAL MENISCUS, LEFT: Primary | ICD-10-CM

## 2025-02-19 PROCEDURE — 99213 OFFICE O/P EST LOW 20 MIN: CPT | Performed by: ORTHOPAEDIC SURGERY

## 2025-02-19 NOTE — PROGRESS NOTES
Chief Complaint:   Chief Complaint   Patient presents with    Knee Pain     Left Knee F/U, pain increasing, states of hip and back pain also.         Medardo Sandoval sees us again about his left knee.  He was last seen here in the office 3/8/2023.  He was diagnosed with chondromalacia and a medial meniscus tear at that time.  We had talked about arthroscopic treatment.  It is unclear what interfered and prevented that from happening.  Now he is wanting to have the knee taken care of.  He also complains of a lot of back problems and I informed him that I did not treat spine problems and he could talk with his primary care doctor about treatment and referral for that.  He has discomfort in the knee with some crackling and popping and discomfort with more activity.      Allergies; medications; past medical, surgical, family, and social history; and problem list have been reviewed today and updated as indicated in this encounter seen below.    Exam: BMI is 44.35.  Skin condition and gross neurovascular function are good in the left lower extremity.  Hip range of motion is good without pain or instability.  The left knee has some discomfort anterolateral about the the patella to palpation with no gross effusion and no hyperemia or erythema today.  He is grossly stable collateral and cruciate ligaments.  Penny testing stress examination does cause discomfort there is crepitus patellofemoral with flexion and extension.    Radiographs: Previous MRI was reviewed and shows some edema noted bone of the distal femur was probably related to stress.  He has some hypertrophic marginal changes about the femoral condyles both distal and posterior.  There is some altered signal in the posterior horn of the medial meniscus and slight extrusion of the meniscus which seems a little narrow.    ASSESSMENT:    Medardo was seen today for knee pain.    Diagnoses and all orders for this visit:    Degenerative tear of posterior horn of

## 2025-02-25 ASSESSMENT — ENCOUNTER SYMPTOMS
GASTROINTESTINAL NEGATIVE: 1
SINUS PAIN: 0
EYE REDNESS: 0
EYE DISCHARGE: 0
PHOTOPHOBIA: 0
COUGH: 0
BACK PAIN: 1
RHINORRHEA: 0
SHORTNESS OF BREATH: 0

## 2025-02-25 NOTE — PROGRESS NOTES
Cigarettes    Smokeless tobacco: Never   Vaping Use    Vaping status: Never Used   Substance and Sexual Activity    Alcohol use: Yes     Comment: social    Drug use: No     Social Determinants of Health     Financial Resource Strain: Low Risk  (8/16/2021)    Overall Financial Resource Strain (CARDIA)     Difficulty of Paying Living Expenses: Not hard at all   Food Insecurity: No Food Insecurity (2/12/2025)    Hunger Vital Sign     Worried About Running Out of Food in the Last Year: Never true     Ran Out of Food in the Last Year: Never true   Transportation Needs: No Transportation Needs (2/12/2025)    PRAPARE - Transportation     Lack of Transportation (Medical): No     Lack of Transportation (Non-Medical): No   Housing Stability: Low Risk  (2/12/2025)    Housing Stability Vital Sign     Unable to Pay for Housing in the Last Year: No     Number of Times Moved in the Last Year: 0     Homeless in the Last Year: No        SURGICAL HISTORY  Past Surgical History:   Procedure Laterality Date    COLONOSCOPY      FOOT AMPUTATION      right foot    FOOT DEBRIDEMENT  2-    right foot stump                 CURRENT MEDICATIONS  Current Outpatient Medications   Medication Sig Dispense Refill    losartan (COZAAR) 25 MG tablet Take 1 tablet by mouth daily 90 tablet 1    amLODIPine (NORVASC) 10 MG tablet Take 1 tablet by mouth daily 90 tablet 1     No current facility-administered medications for this visit.       ALLERGIES  No Known Allergies    PHYSICAL EXAM    /87   Pulse 76   Temp 97.7 °F (36.5 °C) (Temporal)   Wt (!) 148.3 kg (327 lb)   BMI 44.35 kg/m²     Physical Exam  Vitals and nursing note reviewed.   Constitutional:       Appearance: Normal appearance. He is morbidly obese.   Eyes:      General: No scleral icterus.     Conjunctiva/sclera: Conjunctivae normal.   Cardiovascular:      Rate and Rhythm: Normal rate and regular rhythm.      Heart sounds: Normal heart sounds.   Pulmonary:      Effort:

## 2025-02-28 ENCOUNTER — HOSPITAL ENCOUNTER (OUTPATIENT)
Age: 50
Discharge: HOME | End: 2025-02-28
Payer: COMMERCIAL

## 2025-02-28 DIAGNOSIS — Z13.220 SCREENING, LIPID: ICD-10-CM

## 2025-02-28 DIAGNOSIS — R73.9 HYPERGLYCEMIA: ICD-10-CM

## 2025-02-28 DIAGNOSIS — E66.813 CLASS 3 SEVERE OBESITY DUE TO EXCESS CALORIES WITHOUT SERIOUS COMORBIDITY WITH BODY MASS INDEX (BMI) OF 40.0 TO 44.9 IN ADULT: ICD-10-CM

## 2025-02-28 DIAGNOSIS — E66.01 CLASS 3 SEVERE OBESITY DUE TO EXCESS CALORIES WITHOUT SERIOUS COMORBIDITY WITH BODY MASS INDEX (BMI) OF 40.0 TO 44.9 IN ADULT: ICD-10-CM

## 2025-02-28 LAB
ALBUMIN SERPL-MCNC: 4.7 G/DL (ref 3.5–5.2)
ALP SERPL-CCNC: 106 U/L (ref 40–129)
ALT SERPL-CCNC: 41 U/L (ref 0–40)
ANION GAP SERPL CALCULATED.3IONS-SCNC: 11 MMOL/L (ref 7–16)
AST SERPL-CCNC: 24 U/L (ref 0–39)
BASOPHILS # BLD: 0.04 K/UL (ref 0–0.2)
BASOPHILS NFR BLD: 1 % (ref 0–2)
BILIRUB SERPL-MCNC: 0.5 MG/DL (ref 0–1.2)
BUN SERPL-MCNC: 17 MG/DL (ref 6–20)
CALCIUM SERPL-MCNC: 10 MG/DL (ref 8.6–10.2)
CHLORIDE SERPL-SCNC: 102 MMOL/L (ref 98–107)
CHOLEST SERPL-MCNC: 201 MG/DL
CO2 SERPL-SCNC: 27 MMOL/L (ref 22–29)
CREAT SERPL-MCNC: 1 MG/DL (ref 0.7–1.2)
EOSINOPHIL # BLD: 0.15 K/UL (ref 0.05–0.5)
EOSINOPHILS RELATIVE PERCENT: 3 % (ref 0–6)
ERYTHROCYTE [DISTWIDTH] IN BLOOD BY AUTOMATED COUNT: 12.2 % (ref 12–16)
GFR, ESTIMATED: >90 ML/MIN/1.73M2
GLUCOSE SERPL-MCNC: 111 MG/DL (ref 74–99)
HBA1C MFR BLD: 5.8 % (ref 4–5.6)
HCT VFR BLD AUTO: 45.7 % (ref 37–54)
HDLC SERPL-MCNC: 30 MG/DL
HGB BLD-MCNC: 15.9 G/DL (ref 12.5–16.5)
IMM GRANULOCYTES # BLD AUTO: <0.03 K/UL (ref 0–0.58)
IMM GRANULOCYTES NFR BLD: 0 % (ref 0–5)
LDLC SERPL CALC-MCNC: 146 MG/DL
LYMPHOCYTES NFR BLD: 1.72 K/UL (ref 1.5–4)
LYMPHOCYTES RELATIVE PERCENT: 29 % (ref 20–42)
MCH RBC QN AUTO: 29.4 PG (ref 26–35)
MCHC RBC AUTO-ENTMCNC: 34.8 G/DL (ref 32–34.5)
MCV RBC AUTO: 84.5 FL (ref 80–99.9)
MONOCYTES NFR BLD: 0.71 K/UL (ref 0.1–0.95)
MONOCYTES NFR BLD: 12 % (ref 2–12)
NEUTROPHILS NFR BLD: 55 % (ref 43–80)
NEUTS SEG NFR BLD: 3.25 K/UL (ref 1.8–7.3)
PLATELET # BLD AUTO: 249 K/UL (ref 130–450)
PMV BLD AUTO: 9.9 FL (ref 7–12)
POTASSIUM SERPL-SCNC: 4.7 MMOL/L (ref 3.5–5)
PROT SERPL-MCNC: 7.8 G/DL (ref 6.4–8.3)
RBC # BLD AUTO: 5.41 M/UL (ref 3.8–5.8)
SODIUM SERPL-SCNC: 140 MMOL/L (ref 132–146)
TRIGL SERPL-MCNC: 126 MG/DL
TSH SERPL DL<=0.05 MIU/L-ACNC: 0.74 UIU/ML (ref 0.27–4.2)
VLDLC SERPL CALC-MCNC: 25 MG/DL
WBC OTHER # BLD: 5.9 K/UL (ref 4.5–11.5)

## 2025-02-28 PROCEDURE — 84443 ASSAY THYROID STIM HORMONE: CPT

## 2025-02-28 PROCEDURE — 36415 COLL VENOUS BLD VENIPUNCTURE: CPT

## 2025-02-28 PROCEDURE — 83036 HEMOGLOBIN GLYCOSYLATED A1C: CPT

## 2025-02-28 PROCEDURE — 80061 LIPID PANEL: CPT

## 2025-02-28 PROCEDURE — 85025 COMPLETE CBC W/AUTO DIFF WBC: CPT

## 2025-02-28 PROCEDURE — 80053 COMPREHEN METABOLIC PANEL: CPT

## 2025-03-10 ENCOUNTER — HOSPITAL ENCOUNTER (EMERGENCY)
Age: 50
Discharge: HOME | End: 2025-03-10
Payer: COMMERCIAL

## 2025-03-10 VITALS
WEIGHT: 315 LBS | SYSTOLIC BLOOD PRESSURE: 151 MMHG | BODY MASS INDEX: 44.1 KG/M2 | DIASTOLIC BLOOD PRESSURE: 93 MMHG | TEMPERATURE: 98.8 F | HEIGHT: 71 IN | OXYGEN SATURATION: 96 % | HEART RATE: 64 BPM | RESPIRATION RATE: 20 BRPM

## 2025-03-10 DIAGNOSIS — K06.8 PAIN IN GUMS: Primary | ICD-10-CM

## 2025-03-10 DIAGNOSIS — I10 ELEVATED BLOOD PRESSURE READING WITH DIAGNOSIS OF HYPERTENSION: ICD-10-CM

## 2025-03-10 DIAGNOSIS — K08.409 STATUS POST TOOTH EXTRACTION: ICD-10-CM

## 2025-03-10 PROCEDURE — 6360000002 HC RX W HCPCS

## 2025-03-10 PROCEDURE — 6370000000 HC RX 637 (ALT 250 FOR IP)

## 2025-03-10 PROCEDURE — 96372 THER/PROPH/DIAG INJ SC/IM: CPT

## 2025-03-10 PROCEDURE — 99284 EMERGENCY DEPT VISIT MOD MDM: CPT

## 2025-03-10 RX ORDER — IBUPROFEN 600 MG/1
600 TABLET, FILM COATED ORAL 4 TIMES DAILY PRN
Qty: 40 TABLET | Refills: 0 | Status: SHIPPED | OUTPATIENT
Start: 2025-03-10

## 2025-03-10 RX ORDER — HYDROCODONE BITARTRATE AND ACETAMINOPHEN 5; 325 MG/1; MG/1
1 TABLET ORAL EVERY 6 HOURS PRN
Qty: 18 TABLET | Refills: 0 | Status: SHIPPED | OUTPATIENT
Start: 2025-03-10 | End: 2025-03-13

## 2025-03-10 RX ORDER — KETOROLAC TROMETHAMINE 30 MG/ML
30 INJECTION, SOLUTION INTRAMUSCULAR; INTRAVENOUS ONCE
Status: COMPLETED | OUTPATIENT
Start: 2025-03-10 | End: 2025-03-10

## 2025-03-10 RX ADMIN — KETOROLAC TROMETHAMINE 30 MG: 30 INJECTION INTRAMUSCULAR; INTRAVENOUS at 03:33

## 2025-03-10 RX ADMIN — AMOXICILLIN AND CLAVULANATE POTASSIUM 1 TABLET: 875; 125 TABLET, FILM COATED ORAL at 03:33

## 2025-03-10 ASSESSMENT — PAIN SCALES - GENERAL
PAINLEVEL_OUTOF10: 3
PAINLEVEL_OUTOF10: 8

## 2025-03-10 ASSESSMENT — LIFESTYLE VARIABLES
HOW MANY STANDARD DRINKS CONTAINING ALCOHOL DO YOU HAVE ON A TYPICAL DAY: PATIENT DOES NOT DRINK
HOW OFTEN DO YOU HAVE A DRINK CONTAINING ALCOHOL: NEVER

## 2025-03-10 ASSESSMENT — PAIN - FUNCTIONAL ASSESSMENT: PAIN_FUNCTIONAL_ASSESSMENT: 0-10

## 2025-03-10 ASSESSMENT — PAIN DESCRIPTION - DESCRIPTORS
DESCRIPTORS: ACHING
DESCRIPTORS: ACHING

## 2025-03-10 ASSESSMENT — PAIN DESCRIPTION - PAIN TYPE: TYPE: ACUTE PAIN

## 2025-03-10 ASSESSMENT — PAIN DESCRIPTION - ORIENTATION: ORIENTATION: LEFT;UPPER

## 2025-03-10 ASSESSMENT — PAIN DESCRIPTION - LOCATION
LOCATION: TEETH
LOCATION: MOUTH

## 2025-03-10 NOTE — ED PROVIDER NOTES
Independent TY Visit     [facility]  Department of Emergency Medicine   ED  Encounter Note  Admit Date/RoomTime: 3/10/2025  3:48 AM  ED Room: [place] [id number]    NAME: Medardo Sandoval  : 1975  MRN: [id number]     Chief Complaint:  Dental Pain (Multiple teeth pulled Friday)    History of Present Illness   History provided buy the patient and chart review.     Medardo Sandoval is a 50 y.o. old male who presents to the emergency department by private vehicle, for non-traumatic diffuse upper gingival pain and mild swelling, which started 3 days prior to arrival following dental extractions. He is taking Pen VK.  Since onset the symptoms have been gradually worsening and moderate to severe in severity. He was prescribed 3 days of Norco, but is now out. Ibuprofen and Tylenol are not really helping.  Worsened by  chewing and improved by Norco.  Associated Signs & Symptoms: nothing additional. Post extraction bleeding stopped later on in the day of his extractions. He denies fever, sore throat, sinus pain, facial pain, facial swelling, facial redness, oral drainage, trismus, drooling, or extension of the pain into his neck. He last took Norco at 9 am yesterday and ibuprofen and Tylenol around 5 PM yesterday.           Onset:       Spontaneous:   no.     Following Trauma:   no.     Previous Caries:   yes.     Recent Dental Procedure:   yes.     ROS   Pertinent positives and negatives are stated within HPI, all other systems reviewed and are negative.    Past Medical History:  has a past medical history of Hypertension.    Surgical History:  has a past surgical history that includes Foot Amputation; Colonoscopy; and Foot Debridement (2012).    Social History:  reports that he has been smoking cigarettes. He has a 12.5 pack-year smoking history. He has never used smokeless tobacco. He reports current alcohol use. He reports that he does not use drugs.    Family History: family history is not on file.

## 2025-03-10 NOTE — DISCHARGE INSTRUCTIONS
Stop the penicillin and start the amoxicillin-clavulanate.     Alternate between the hydrocodone and ibuprofen every 3 hours.     Make sure your are keeping your mouth clean and rinsed after eating.     Call your dentist today to schedule a follow up appointment.

## 2025-03-12 ENCOUNTER — OFFICE VISIT (OUTPATIENT)
Dept: FAMILY MEDICINE CLINIC | Age: 50
End: 2025-03-12
Payer: COMMERCIAL

## 2025-03-12 VITALS
BODY MASS INDEX: 44.1 KG/M2 | HEART RATE: 60 BPM | WEIGHT: 315 LBS | TEMPERATURE: 97.1 F | DIASTOLIC BLOOD PRESSURE: 68 MMHG | OXYGEN SATURATION: 100 % | RESPIRATION RATE: 18 BRPM | SYSTOLIC BLOOD PRESSURE: 138 MMHG | HEIGHT: 71 IN

## 2025-03-12 DIAGNOSIS — I10 PRIMARY HYPERTENSION: ICD-10-CM

## 2025-03-12 DIAGNOSIS — E66.813 CLASS 3 SEVERE OBESITY DUE TO EXCESS CALORIES WITHOUT SERIOUS COMORBIDITY WITH BODY MASS INDEX (BMI) OF 40.0 TO 44.9 IN ADULT: ICD-10-CM

## 2025-03-12 DIAGNOSIS — M54.16 LEFT LUMBAR RADICULOPATHY: Primary | ICD-10-CM

## 2025-03-12 DIAGNOSIS — E66.01 CLASS 3 SEVERE OBESITY DUE TO EXCESS CALORIES WITHOUT SERIOUS COMORBIDITY WITH BODY MASS INDEX (BMI) OF 40.0 TO 44.9 IN ADULT: ICD-10-CM

## 2025-03-12 DIAGNOSIS — E78.5 HYPERLIPIDEMIA, UNSPECIFIED HYPERLIPIDEMIA TYPE: ICD-10-CM

## 2025-03-12 PROCEDURE — 3075F SYST BP GE 130 - 139MM HG: CPT | Performed by: FAMILY MEDICINE

## 2025-03-12 PROCEDURE — G2211 COMPLEX E/M VISIT ADD ON: HCPCS | Performed by: FAMILY MEDICINE

## 2025-03-12 PROCEDURE — 99214 OFFICE O/P EST MOD 30 MIN: CPT | Performed by: FAMILY MEDICINE

## 2025-03-12 PROCEDURE — 3078F DIAST BP <80 MM HG: CPT | Performed by: FAMILY MEDICINE

## 2025-03-12 RX ORDER — AMOXICILLIN 500 MG/1
TABLET, FILM COATED ORAL
COMMUNITY
Start: 2025-03-07 | End: 2025-03-12

## 2025-03-12 RX ORDER — CHLORHEXIDINE GLUCONATE ORAL RINSE 1.2 MG/ML
SOLUTION DENTAL
COMMUNITY
Start: 2025-03-10

## 2025-03-12 NOTE — PROGRESS NOTES
3/23/2025    Medardo Sandoval    Chief Complaint   Patient presents with    Hypertension       HPI  History was obtained from patient.  Medardo is a 50 y.o. male who presents today with the followin. Left lumbar radiculopathy    2. Primary hypertension    3. Class 3 severe obesity due to excess calories without serious comorbidity with body mass index (BMI) of 40.0 to 44.9 in adult    Patient presents for follow-up of hypertension and obesity and left lumbar radiculopathy.  Patient had x-ray of the lumbar spine recently that showed chronic appearing mild anterior wedging of T10-L1 vertebral bodies.  This appeared to be old.  He also has multilevel degenerative changes most severe at L3-4 and L5-S1.  Patient continues to have mild left lumbar radiculopathy as before.  Patient is taking his medication as prescribed.  Patient has no new complaints today.     REVIEW OF SYMPTOMS    Review of Systems   Constitutional:  Negative for chills, fatigue and fever.   HENT:  Negative for congestion, mouth sores, postnasal drip, rhinorrhea and sinus pain.    Eyes:  Negative for photophobia, discharge and redness.   Respiratory:  Negative for cough and shortness of breath.    Cardiovascular:  Negative for chest pain.   Gastrointestinal: Negative.    Genitourinary:  Negative for difficulty urinating, dysuria, hematuria and urgency.   Musculoskeletal:  Positive for back pain.   Neurological:  Negative for headaches.   Psychiatric/Behavioral:  Negative for sleep disturbance.        PAST MEDICAL HISTORY  Past Medical History:   Diagnosis Date    Hypertension     not on meds       FAMILY HISTORY  History reviewed. No pertinent family history.    SOCIAL HISTORY  Social History     Socioeconomic History    Marital status: Single     Spouse name: None    Number of children: None    Years of education: None    Highest education level: None   Tobacco Use    Smoking status: Former     Current packs/day: 0.00     Average packs/day: 0.5

## 2025-03-19 ENCOUNTER — OFFICE VISIT (OUTPATIENT)
Dept: FAMILY MEDICINE CLINIC | Age: 50
End: 2025-03-19
Payer: COMMERCIAL

## 2025-03-19 VITALS
HEART RATE: 77 BPM | SYSTOLIC BLOOD PRESSURE: 139 MMHG | WEIGHT: 315 LBS | TEMPERATURE: 98 F | BODY MASS INDEX: 44.63 KG/M2 | DIASTOLIC BLOOD PRESSURE: 84 MMHG

## 2025-03-19 DIAGNOSIS — M17.12 ARTHRITIS OF LEFT KNEE: Primary | ICD-10-CM

## 2025-03-19 DIAGNOSIS — I10 PRIMARY HYPERTENSION: ICD-10-CM

## 2025-03-19 PROCEDURE — 99214 OFFICE O/P EST MOD 30 MIN: CPT | Performed by: FAMILY MEDICINE

## 2025-03-19 PROCEDURE — G2211 COMPLEX E/M VISIT ADD ON: HCPCS | Performed by: FAMILY MEDICINE

## 2025-03-19 PROCEDURE — 3079F DIAST BP 80-89 MM HG: CPT | Performed by: FAMILY MEDICINE

## 2025-03-19 PROCEDURE — 3075F SYST BP GE 130 - 139MM HG: CPT | Performed by: FAMILY MEDICINE

## 2025-03-19 RX ORDER — AMLODIPINE BESYLATE 10 MG/1
10 TABLET ORAL DAILY
Qty: 90 TABLET | Refills: 1 | Status: SHIPPED | OUTPATIENT
Start: 2025-03-19

## 2025-03-19 NOTE — PROGRESS NOTES
2025    Medardo Sandoval    Chief Complaint   Patient presents with    Other     Pt says he just need to speak to you.       HPI  History was obtained from patient.  Medardo is a 50 y.o. male who presents today with the followin. Arthritis of left knee    2. Primary hypertension    Patient presents for follow-up of hypertension and arthritis of the left knee.  Patient is taking his blood pressure medicine as prescribed.  Patient is following with orthopedics for his left knee pain.  Patient was diagnosed 2 years ago with a torn medial meniscus.     REVIEW OF SYMPTOMS    Review of Systems   Constitutional:  Negative for chills, fatigue and fever.   HENT:  Negative for congestion, mouth sores, postnasal drip, rhinorrhea and sinus pain.    Eyes:  Negative for photophobia, discharge and redness.   Respiratory:  Negative for cough and shortness of breath.    Cardiovascular:  Negative for chest pain.   Gastrointestinal: Negative.    Genitourinary:  Negative for difficulty urinating, dysuria, hematuria and urgency.   Musculoskeletal:  Positive for arthralgias (Left knee).   Neurological:  Negative for headaches.   Psychiatric/Behavioral:  Negative for sleep disturbance.        PAST MEDICAL HISTORY  Past Medical History:   Diagnosis Date    Hypertension     not on meds       FAMILY HISTORY  No family history on file.    SOCIAL HISTORY  Social History     Socioeconomic History    Marital status: Single   Tobacco Use    Smoking status: Former     Current packs/day: 0.00     Average packs/day: 0.5 packs/day for 17.0 years (8.5 ttl pk-yrs)     Types: Cigarettes     Start date:      Quit date:      Years since quittin.2    Smokeless tobacco: Never   Vaping Use    Vaping status: Never Used   Substance and Sexual Activity    Alcohol use: Yes     Comment: social    Drug use: No     Social Drivers of Health     Financial Resource Strain: Low Risk  (2021)    Overall Financial Resource Strain (CARDIA)

## 2025-03-26 ENCOUNTER — PREP FOR PROCEDURE (OUTPATIENT)
Dept: ORTHOPEDIC SURGERY | Age: 50
End: 2025-03-26

## 2025-03-26 ENCOUNTER — OFFICE VISIT (OUTPATIENT)
Dept: ORTHOPEDIC SURGERY | Age: 50
End: 2025-03-26
Payer: COMMERCIAL

## 2025-03-26 ENCOUNTER — TELEPHONE (OUTPATIENT)
Dept: ORTHOPEDIC SURGERY | Age: 50
End: 2025-03-26

## 2025-03-26 VITALS — HEIGHT: 71 IN | WEIGHT: 315 LBS | BODY MASS INDEX: 44.1 KG/M2

## 2025-03-26 DIAGNOSIS — M94.262 CHONDROMALACIA, LEFT KNEE: ICD-10-CM

## 2025-03-26 DIAGNOSIS — M23.322 DERANGEMENT OF POSTERIOR HORN OF MEDIAL MENISCUS, LEFT: ICD-10-CM

## 2025-03-26 DIAGNOSIS — M23.322 DEGENERATIVE TEAR OF POSTERIOR HORN OF MEDIAL MENISCUS, LEFT: Primary | ICD-10-CM

## 2025-03-26 PROCEDURE — 99213 OFFICE O/P EST LOW 20 MIN: CPT | Performed by: ORTHOPAEDIC SURGERY

## 2025-03-26 NOTE — TELEPHONE ENCOUNTER
Prior Authorization Form:      DEMOGRAPHICS:                     Patient Name:  Medardo Stewart  Patient :  1975            Insurance:  Payor: [employer] [place] / Plan: [employer] [place] / Product Type: *No Product type* /   Insurance ID Number:    Payer/Plan Subscr  Sex Relation Sub. Ins. ID Effective Group Num   1. [facility] C* MEDARDO STEWART 1975 Male Self [id number]                                    [address]         DIAGNOSIS & PROCEDURE:                       Procedure/Operation: ARTHROSCOPIC EXAM AND TREATMENT LEFT KNEE           CPT Code: 21539    Diagnosis:  DEGENERATIVE TEAR POSTERIOR HORN MEDIAL MENISCUS LEFT    ICD10 Code: M23.322    Location:  [facility]    Surgeon:  BECKY BYRNE    SCHEDULING INFORMATION:                          Date: 04/10/2025    Time: TBA              Anesthesia:  General                                                       Status:  Outpatient        Special Comments:  NONE       Electronically signed by Julieth Stark on 3/26/2025 at 10:30 AM

## 2025-03-26 NOTE — PROGRESS NOTES
Chief Complaint:   Chief Complaint   Patient presents with    Knee Pain     Patient is presenting today for left knee pain. Pain is described as grinding and rated 8-9/10. Pain is made worse after walking a lot. He takes tylenol for pain as needed. Would like to discuss surgery options today.        Medardo Sandoval follows up regarding his left knee.  He continues to bother him anterior and posterior.  He got his dental work taken care of.  He is interested in having the left knee scoped.  We had discussed this in the past to deal with his meniscal pathology and any other issues we could help him with at the time.      Allergies; medications; past medical, surgical, family, and social history; and problem list have been reviewed today and updated as indicated in this encounter seen below.    Exam: The left knee has some discomfort anterolateral about the the patella to palpation with no gross effusion and no hyperemia or erythema today. He is grossly stable collateral and cruciate ligaments. Penny testing stress examination does cause discomfort there is crepitus patellofemoral with flexion and extension.         ASSESSMENT:    Medardo was seen today for knee pain.    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  Understanding was indicated.  Will schedule left knee arthroscopic surgery as an outpatient procedure.    No follow-ups on file.       Current Outpatient Medications   Medication Sig Dispense Refill    amLODIPine (NORVASC) 10 MG tablet Take 1 tablet by mouth daily 90 tablet 1    chlorhexidine (PERIDEX) 0.12 % solution RINSE TWICE DAILY FOR 30 SECONDS THEN EXPECTORATE      ibuprofen (ADVIL;MOTRIN) 600 MG tablet Take 1 tablet by mouth 4 times daily as needed for Pain 40 tablet 0

## 2025-04-01 ASSESSMENT — ENCOUNTER SYMPTOMS
COUGH: 0
EYE DISCHARGE: 0
GASTROINTESTINAL NEGATIVE: 1
SHORTNESS OF BREATH: 0
PHOTOPHOBIA: 0
RHINORRHEA: 0
SINUS PAIN: 0
EYE REDNESS: 0

## 2025-04-03 ENCOUNTER — HOSPITAL ENCOUNTER (OUTPATIENT)
Age: 50
Discharge: HOME | End: 2025-04-03
Payer: COMMERCIAL

## 2025-04-03 LAB
EKG ATRIAL RATE: 63 BPM
EKG P AXIS: 58 DEGREES
EKG P-R INTERVAL: 160 MS
EKG Q-T INTERVAL: 442 MS
EKG QRS DURATION: 126 MS
EKG QTC CALCULATION (BAZETT): 452 MS
EKG R AXIS: -87 DEGREES
EKG T AXIS: 26 DEGREES
EKG VENTRICULAR RATE: 63 BPM

## 2025-04-03 PROCEDURE — 93005 ELECTROCARDIOGRAM TRACING: CPT | Performed by: ANESTHESIOLOGY

## 2025-04-07 ENCOUNTER — OFFICE VISIT (OUTPATIENT)
Dept: FAMILY MEDICINE CLINIC | Age: 50
End: 2025-04-07
Payer: COMMERCIAL

## 2025-04-07 VITALS
BODY MASS INDEX: 44.1 KG/M2 | SYSTOLIC BLOOD PRESSURE: 126 MMHG | WEIGHT: 315 LBS | OXYGEN SATURATION: 97 % | HEIGHT: 71 IN | HEART RATE: 68 BPM | DIASTOLIC BLOOD PRESSURE: 78 MMHG | TEMPERATURE: 97.9 F

## 2025-04-07 DIAGNOSIS — I45.2 BIFASCICULAR BLOCK: ICD-10-CM

## 2025-04-07 DIAGNOSIS — E78.5 HYPERLIPIDEMIA, UNSPECIFIED HYPERLIPIDEMIA TYPE: ICD-10-CM

## 2025-04-07 DIAGNOSIS — I10 PRIMARY HYPERTENSION: ICD-10-CM

## 2025-04-07 DIAGNOSIS — M17.12 ARTHRITIS OF LEFT KNEE: ICD-10-CM

## 2025-04-07 DIAGNOSIS — E66.813 CLASS 3 SEVERE OBESITY DUE TO EXCESS CALORIES WITHOUT SERIOUS COMORBIDITY WITH BODY MASS INDEX (BMI) OF 40.0 TO 44.9 IN ADULT: ICD-10-CM

## 2025-04-07 DIAGNOSIS — Z01.818 PREOPERATIVE CLEARANCE: Primary | ICD-10-CM

## 2025-04-07 PROCEDURE — 99214 OFFICE O/P EST MOD 30 MIN: CPT | Performed by: FAMILY MEDICINE

## 2025-04-07 PROCEDURE — 3078F DIAST BP <80 MM HG: CPT | Performed by: FAMILY MEDICINE

## 2025-04-07 PROCEDURE — G2211 COMPLEX E/M VISIT ADD ON: HCPCS | Performed by: FAMILY MEDICINE

## 2025-04-07 PROCEDURE — 3074F SYST BP LT 130 MM HG: CPT | Performed by: FAMILY MEDICINE

## 2025-04-07 ASSESSMENT — ENCOUNTER SYMPTOMS
RHINORRHEA: 0
PHOTOPHOBIA: 0
SHORTNESS OF BREATH: 0
SINUS PAIN: 0
GASTROINTESTINAL NEGATIVE: 1
EYE DISCHARGE: 0
EYE REDNESS: 0
COUGH: 0
BACK PAIN: 1

## 2025-04-07 NOTE — PROGRESS NOTES
pressure is well-controlled with amlodipine 10 mg and losartan 25 mg daily.    Hyperlipidemia, unspecified hyperlipidemia type  Patient's last LDL cholesterol was 146 and triglycerides 126.  Patient is not currently on a statin and this will be discussed further on his next appointment.    Class 3 severe obesity due to excess calories without serious comorbidity with body mass index (BMI) of 40.0 to 44.9 in adult    Bifascicular block        Return in about 10 weeks (around 6/16/2025).         Electronically signed by Geovanny Duran DO on 4/11/2025

## 2025-04-07 NOTE — PROGRESS NOTES
abnormalities.    Patient is medically cleared for his upcoming arthroscopy and no further workup is indicated.    Arthritis of left knee    Primary hypertension  Patient's blood pressure is well-controlled and will continue with amlodipine 10 mg and losartan 25 mg daily.    Hyperlipidemia, unspecified hyperlipidemia type  Patient's LDL cholesterol was elevated on recent labs at 145.  Patient will try to maintain a Mediterranean diet and weight loss.    Class 3 severe obesity due to excess calories without serious comorbidity with body mass index (BMI) of 40.0 to 44.9 in adult  Patient will get more aggressive with decreasing his caloric intake and avoiding high calorie food items such as sweets and fried and fatty foods.    Bifascicular block  Patient has bifascicular block but no coronary symptoms.      Return in about 10 weeks (around 6/16/2025).         Electronically signed by Geovanny Duran DO on 4/7/2025

## 2025-04-08 ENCOUNTER — ANESTHESIA EVENT (OUTPATIENT)
Dept: OPERATING ROOM | Age: 50
End: 2025-04-08
Payer: COMMERCIAL

## 2025-04-08 NOTE — ANESTHESIA PRE PROCEDURE
Department of Anesthesiology  Preprocedure Note       Name:  Medardo Sandoval   Age:  50 y.o.  :  1975                                          MRN:  [id number]         Date:  2025      Surgeon: Surgeon(s):  GIDEON Cummins DO    Procedure: Procedure(s):  LEFT KNEE ARTHROSCOPY MENISCAL REPAIR    Medications prior to admission:   Prior to Admission medications    Medication Sig Start Date End Date Taking? Authorizing Provider   amLODIPine (NORVASC) 10 MG tablet Take 1 tablet by mouth daily 3/19/25   Geovanny Duran DO   chlorhexidine (PERIDEX) 0.12 % solution RINSE TWICE DAILY FOR 30 SECONDS THEN EXPECTORATE 3/10/25   Provider, MD Ayad   losartan (COZAAR) 25 MG tablet Take 1 tablet by mouth daily 25   Geovanny Duran DO       Current medications:    No current facility-administered medications for this encounter.     Current Outpatient Medications   Medication Sig Dispense Refill    amLODIPine (NORVASC) 10 MG tablet Take 1 tablet by mouth daily 90 tablet 1    chlorhexidine (PERIDEX) 0.12 % solution RINSE TWICE DAILY FOR 30 SECONDS THEN EXPECTORATE      losartan (COZAAR) 25 MG tablet Take 1 tablet by mouth daily 90 tablet 1       Allergies:  No Known Allergies    Problem List:    Patient Active Problem List   Diagnosis Code    Primary hypertension I10    Class 3 severe obesity due to excess calories without serious comorbidity with body mass index (BMI) of 40.0 to 44.9 in adult E66.813, E66.01, Z68.41    Chronic pain of left knee M25.562, G89.29    Hypersomnolence G47.10    Cigarette nicotine dependence without complication F17.210    Derangement of posterior horn of medial meniscus, left M23.322       Past Medical History:        Diagnosis Date    Hypertension     not on meds       Past Surgical History:        Procedure Laterality Date    COLONOSCOPY      FOOT AMPUTATION Right     right foot.  SYME-on right foot    FOOT DEBRIDEMENT  2012    right foot stump    LEG SURGERY Right  2007    fractures with pins and screws       Social History:    Social History     Tobacco Use    Smoking status: Former     Current packs/day: 0.00     Average packs/day: 0.5 packs/day for 17.0 years (8.5 ttl pk-yrs)     Types: Cigarettes     Start date:      Quit date:      Years since quittin.2    Smokeless tobacco: Never   Substance Use Topics    Alcohol use: Yes     Comment: social                                Counseling given: Not Answered      Vital Signs (Current):   Vitals:    25 1235   Weight: (!) 145.2 kg (320 lb)   Height: 1.803 m (5' 11\")                                              BP Readings from Last 3 Encounters:   25 126/78   25 139/84   25 138/68       NPO Status:                                                                                 BMI:   Wt Readings from Last 3 Encounters:   25 (!) 147.4 kg (325 lb)   25 (!) 145.2 kg (320 lb)   25 (!) 145.2 kg (320 lb)     Body mass index is 44.63 kg/m².    CBC:   Lab Results   Component Value Date/Time    WBC 5.9 2025 11:47 AM    RBC 5.41 2025 11:47 AM    HGB 15.9 2025 11:47 AM    HCT 45.7 2025 11:47 AM    MCV 84.5 2025 11:47 AM    RDW 12.2 2025 11:47 AM     2025 11:47 AM       CMP:   Lab Results   Component Value Date/Time     2025 11:47 AM    K 4.7 2025 11:47 AM     2025 11:47 AM    CO2 27 2025 11:47 AM    BUN 17 2025 11:47 AM    CREATININE 1.0 2025 11:47 AM    LABGLOM >90 2025 11:47 AM    LABGLOM >60 03/15/2023 12:00 PM    GLUCOSE 111 2025 11:47 AM    CALCIUM 10.0 2025 11:47 AM    BILITOT 0.5 2025 11:47 AM    ALKPHOS 106 2025 11:47 AM    AST 24 2025 11:47 AM    ALT 41 2025 11:47 AM       POC Tests: No results for input(s): \"POCGLU\", \"POCNA\", \"POCK\", \"POCCL\", \"POCBUN\", \"POCHEMO\", \"POCHCT\" in the last 72 hours.    Coags: No results found for: \"PROTIME\",  \"INR\", \"APTT\"    HCG (If Applicable): No results found for: \"PREGTESTUR\", \"PREGSERUM\", \"HCG\", \"HCGQUANT\"     ABGs: No results found for: \"PHART\", \"PO2ART\", \"MBR2MJQ\", \"WRJ3VAO\", \"BEART\", \"T9XFDZPD\"     Type & Screen (If Applicable):  No results found for: \"ABORH\", \"LABANTI\"    Drug/Infectious Status (If Applicable):  No results found for: \"HIV\", \"HEPCAB\"    COVID-19 Screening (If Applicable):   Lab Results   Component Value Date/Time    COVID19 Detected 08/16/2021 02:02 PM           Anesthesia Evaluation  Patient summary reviewed   no history of anesthetic complications:   Airway: Mallampati: III  TM distance: >3 FB   Neck ROM: full  Mouth opening: > = 3 FB   Dental: normal exam     Comment: Missing teeth    Pulmonary: breath sounds clear to auscultation                            ROS comment: Former smoker quit 01/2024 9.5 pack years   Cardiovascular:  Exercise tolerance: poor (<4 METS)  (+) hypertension:      ECG reviewed  Rhythm: regular  Rate: normal                 ROS comment: EKG  Normal sinus rhythm  Right bundle branch block  Left anterior fascicular block  Bifascicular block  Septal infarct-probably old  Abnormal ECG  Confirmed by Bill Luna ([id number]) on 4/3/2025 2:21:46 PM         Neuro/Psych:   Negative Neuro/Psych ROS              GI/Hepatic/Renal:   (+) morbid obesity (BMI 45.33)          Endo/Other:                      ROS comment: Derangement of posterior horn of medial meniscus, left [M23.322]    02/28/2025 BMP Glucose 111 Abdominal:   (+) obese          Vascular: negative vascular ROS.         Other Findings:         Anesthesia Plan      general     ASA 3       Induction: intravenous.  BIS  MIPS: Postoperative opioids intended and Prophylactic antiemetics administered.  Anesthetic plan and risks discussed with patient.      Plan discussed with CRNA.              DOS STAFF ADDENDUM:    Pt seen and examined, chart reviewed (including anesthesia, drug and allergy history).       Anesthetic plan,

## 2025-04-09 NOTE — PROGRESS NOTES
[facility]                                                                                                                    PRE OP INSTRUCTIONS FOR  Medardo Sandoval        Date: 4/9/2025    Date of surgery: 4/10/25   Arrival Time: Hospital will call you between 5pm and 7pm with your final arrival time for surgery. Go to front of hospital and check in at information desk.    Nothing by mouth (NPO) as instructed. May have clear liquids up to 2 hours prior to surgery. Nothing solid after midnight. Examples: water, apple juice, black coffee, plain tea    Take the following medications with a small sip of water on the morning of Surgery: Amlodipine     Diabetics may take half the evening dose of insulin but none after midnight.  If you feel symptomatic or have low blood sugar morning of surgery drink 1-2 ounces of apple juice only. If you take a weekly insulin injection _______________, stop 7 days prior to surgery. If you take _______________, stop 3-4 days prior to surgery.    Aspirin, Ibuprofen, Advil, Naproxen, other Anti-inflammatory products should be stopped before surgery as directed by your surgeon, cardiologist, or primary care Doctor. Herbal supplements and Vitamin E should be stopped five days prior.  May take Tylenol unless instructed otherwise by your surgeon.    Check with your Doctor regarding stopping Plavix, Coumadin, Lovenox, Eliquis, Effient, or other blood thinners such as, pradaxa, lixiana, xaralto and savaysa.    Do not smoke, chew tobacco, vape, or use illicit drugs and do not drink any alcoholic beverages 24 hours prior to surgery.    You may brush your teeth the morning of surgery.      You MUST make arrangements for a responsible adult, 18 and over, to take you home after your surgery. You will not be allowed to leave alone or drive yourself home.  You will need someone stay with you the first 24 hrs. Your surgery will be cancelled if you do not have a ride home or  you have any further questions.   Pre Admit Testing           [phone]     [facility] [phone]

## 2025-04-09 NOTE — H&P
History and Physical      CHIEF COMPLAINT: Left knee pain    HISTORY OF PRESENT ILLNESS:      Chronic in nature.  Meniscus tear confirmed on MRI.    Past Medical History:        Diagnosis Date    Hypertension     not on meds     Past Surgical History:        Procedure Laterality Date    COLONOSCOPY      FOOT AMPUTATION Right 2006    right foot.  SYME-on right foot    FOOT DEBRIDEMENT  02/12/2012    right foot stump    LEG SURGERY Right 2007    fractures with pins and screws     Social History:    TOBACCO:   reports that he quit smoking about 15 months ago. His smoking use included cigarettes. He started smoking about 18 years ago. He has a 8.5 pack-year smoking history. He has never used smokeless tobacco.  ETOH:   reports current alcohol use.  DRUGS:   reports no history of drug use.  Family History:   History reviewed. No pertinent family history.  Medications Prior to Admission:  No medications prior to admission.  Allergies:  Patient has no known allergies.    CONSTITUTIONAL:  negative for  chills and anorexia  HEENT:  negative for  tinnitus  RESPIRATORY:  negative for  dyspnea and cyanosis  CARDIOVASCULAR:  negative for  palpitations, syncope  GASTROINTESTINAL:  negative for vomiting and hematemesis  GENITOURINARY:  negative for hematuria  ENDOCRINE:  negative for tremor  MUSCULOSKELETAL: Positive for  joint swelling and decreased range of motion, left kneeNEUROLOGICAL:  negative for seizures and syncope,    BEHAVIOR/PSYCH:  negative for agitated and anxiety    PHYSICAL EXAM:  Ht 1.803 m (5' 11\")   Wt (!) 145.2 kg (320 lb)   BMI 44.63 kg/m²   General appearance:  awake, alert, cooperative, no apparent distress, and appears stated age  Neurologic:  Awake, alert, oriented to name, place and time.  Cranial nerves II-XII are grossly intact.  Motor is 5 out of 5 bilaterally.  Cerebellar finger to nose, heel to shin intact.  Sensory is intact.  Babinski down going, Romberg negative, and gait is normal.  Lungs:  No

## 2025-04-10 ENCOUNTER — HOSPITAL ENCOUNTER (OUTPATIENT)
Age: 50
Setting detail: OUTPATIENT SURGERY
Discharge: HOME | End: 2025-04-10
Attending: ORTHOPAEDIC SURGERY | Admitting: ORTHOPAEDIC SURGERY
Payer: COMMERCIAL

## 2025-04-10 ENCOUNTER — ANESTHESIA (OUTPATIENT)
Dept: OPERATING ROOM | Age: 50
End: 2025-04-10
Payer: COMMERCIAL

## 2025-04-10 VITALS
WEIGHT: 315 LBS | DIASTOLIC BLOOD PRESSURE: 83 MMHG | OXYGEN SATURATION: 95 % | SYSTOLIC BLOOD PRESSURE: 154 MMHG | HEIGHT: 71 IN | TEMPERATURE: 97.8 F | HEART RATE: 85 BPM | BODY MASS INDEX: 44.1 KG/M2 | RESPIRATION RATE: 20 BRPM

## 2025-04-10 DIAGNOSIS — M65.962 SYNOVITIS OF LEFT KNEE: Chronic | ICD-10-CM

## 2025-04-10 DIAGNOSIS — M23.322 DERANGEMENT OF POSTERIOR HORN OF MEDIAL MENISCUS, LEFT: ICD-10-CM

## 2025-04-10 DIAGNOSIS — M25.562 CHRONIC PAIN OF LEFT KNEE: Primary | ICD-10-CM

## 2025-04-10 DIAGNOSIS — S83.272A COMPLEX TEAR OF LATERAL MENISCUS OF LEFT KNEE, INITIAL ENCOUNTER: Chronic | ICD-10-CM

## 2025-04-10 DIAGNOSIS — M94.262 CHONDROMALACIA, LEFT KNEE: ICD-10-CM

## 2025-04-10 DIAGNOSIS — G89.29 CHRONIC PAIN OF LEFT KNEE: Primary | ICD-10-CM

## 2025-04-10 LAB
ANION GAP SERPL CALCULATED.3IONS-SCNC: 11 MMOL/L (ref 7–16)
BUN SERPL-MCNC: 18 MG/DL (ref 6–20)
CALCIUM SERPL-MCNC: 9.2 MG/DL (ref 8.6–10.2)
CHLORIDE SERPL-SCNC: 100 MMOL/L (ref 98–107)
CO2 SERPL-SCNC: 26 MMOL/L (ref 22–29)
CREAT SERPL-MCNC: 0.9 MG/DL (ref 0.7–1.2)
ERYTHROCYTE [DISTWIDTH] IN BLOOD BY AUTOMATED COUNT: 12.2 % (ref 11.5–15)
GFR, ESTIMATED: >90 ML/MIN/1.73M2
GLUCOSE SERPL-MCNC: 110 MG/DL (ref 74–99)
HCT VFR BLD AUTO: 42.9 % (ref 37–54)
HGB BLD-MCNC: 14.8 G/DL (ref 12.5–16.5)
MCH RBC QN AUTO: 30.1 PG (ref 26–35)
MCHC RBC AUTO-ENTMCNC: 34.5 G/DL (ref 32–34.5)
MCV RBC AUTO: 87.4 FL (ref 80–99.9)
PLATELET # BLD AUTO: 202 K/UL (ref 130–450)
PMV BLD AUTO: 9.9 FL (ref 7–12)
POTASSIUM SERPL-SCNC: 4.1 MMOL/L (ref 3.5–5)
RBC # BLD AUTO: 4.91 M/UL (ref 3.8–5.8)
SODIUM SERPL-SCNC: 137 MMOL/L (ref 132–146)
WBC OTHER # BLD: 4.3 K/UL (ref 4.5–11.5)

## 2025-04-10 PROCEDURE — 7100000011 HC PHASE II RECOVERY - ADDTL 15 MIN: Performed by: ORTHOPAEDIC SURGERY

## 2025-04-10 PROCEDURE — 3700000000 HC ANESTHESIA ATTENDED CARE: Performed by: ORTHOPAEDIC SURGERY

## 2025-04-10 PROCEDURE — 2500000003 HC RX 250 WO HCPCS: Performed by: ORTHOPAEDIC SURGERY

## 2025-04-10 PROCEDURE — 3600000013 HC SURGERY LEVEL 3 ADDTL 15MIN: Performed by: ORTHOPAEDIC SURGERY

## 2025-04-10 PROCEDURE — 3700000001 HC ADD 15 MINUTES (ANESTHESIA): Performed by: ORTHOPAEDIC SURGERY

## 2025-04-10 PROCEDURE — 6360000002 HC RX W HCPCS

## 2025-04-10 PROCEDURE — 2580000003 HC RX 258: Performed by: ANESTHESIOLOGY

## 2025-04-10 PROCEDURE — 7100000001 HC PACU RECOVERY - ADDTL 15 MIN: Performed by: ORTHOPAEDIC SURGERY

## 2025-04-10 PROCEDURE — 3600000003 HC SURGERY LEVEL 3 BASE: Performed by: ORTHOPAEDIC SURGERY

## 2025-04-10 PROCEDURE — 85027 COMPLETE CBC AUTOMATED: CPT

## 2025-04-10 PROCEDURE — 6360000002 HC RX W HCPCS: Performed by: ANESTHESIOLOGY

## 2025-04-10 PROCEDURE — 7100000010 HC PHASE II RECOVERY - FIRST 15 MIN: Performed by: ORTHOPAEDIC SURGERY

## 2025-04-10 PROCEDURE — 80048 BASIC METABOLIC PNL TOTAL CA: CPT

## 2025-04-10 PROCEDURE — 29880 ARTHRS KNE SRG MNISECTMY M&L: CPT | Performed by: ORTHOPAEDIC SURGERY

## 2025-04-10 PROCEDURE — 7100000000 HC PACU RECOVERY - FIRST 15 MIN: Performed by: ORTHOPAEDIC SURGERY

## 2025-04-10 PROCEDURE — 6360000002 HC RX W HCPCS: Performed by: NURSE ANESTHETIST, CERTIFIED REGISTERED

## 2025-04-10 PROCEDURE — 6360000002 HC RX W HCPCS: Performed by: ORTHOPAEDIC SURGERY

## 2025-04-10 PROCEDURE — 2709999900 HC NON-CHARGEABLE SUPPLY: Performed by: ORTHOPAEDIC SURGERY

## 2025-04-10 PROCEDURE — 2500000003 HC RX 250 WO HCPCS

## 2025-04-10 RX ORDER — SODIUM CHLORIDE, SODIUM LACTATE, POTASSIUM CHLORIDE, CALCIUM CHLORIDE 600; 310; 30; 20 MG/100ML; MG/100ML; MG/100ML; MG/100ML
INJECTION, SOLUTION INTRAVENOUS CONTINUOUS
Status: DISCONTINUED | OUTPATIENT
Start: 2025-04-10 | End: 2025-04-10 | Stop reason: HOSPADM

## 2025-04-10 RX ORDER — MEPERIDINE HYDROCHLORIDE 25 MG/ML
12.5 INJECTION INTRAMUSCULAR; INTRAVENOUS; SUBCUTANEOUS EVERY 5 MIN PRN
Status: DISCONTINUED | OUTPATIENT
Start: 2025-04-10 | End: 2025-04-10 | Stop reason: HOSPADM

## 2025-04-10 RX ORDER — MIDAZOLAM HYDROCHLORIDE 1 MG/ML
INJECTION, SOLUTION INTRAMUSCULAR; INTRAVENOUS
Status: DISCONTINUED | OUTPATIENT
Start: 2025-04-10 | End: 2025-04-10 | Stop reason: SDUPTHER

## 2025-04-10 RX ORDER — DIPHENHYDRAMINE HYDROCHLORIDE 50 MG/ML
12.5 INJECTION, SOLUTION INTRAMUSCULAR; INTRAVENOUS
Status: DISCONTINUED | OUTPATIENT
Start: 2025-04-10 | End: 2025-04-10 | Stop reason: HOSPADM

## 2025-04-10 RX ORDER — MIDAZOLAM HYDROCHLORIDE 1 MG/ML
2 INJECTION, SOLUTION INTRAMUSCULAR; INTRAVENOUS
Status: DISCONTINUED | OUTPATIENT
Start: 2025-04-10 | End: 2025-04-10 | Stop reason: HOSPADM

## 2025-04-10 RX ORDER — SODIUM CHLORIDE 0.9 % (FLUSH) 0.9 %
5-40 SYRINGE (ML) INJECTION EVERY 12 HOURS SCHEDULED
Status: DISCONTINUED | OUTPATIENT
Start: 2025-04-10 | End: 2025-04-10 | Stop reason: HOSPADM

## 2025-04-10 RX ORDER — HYDROCODONE BITARTRATE AND ACETAMINOPHEN 5; 325 MG/1; MG/1
1 TABLET ORAL EVERY 6 HOURS PRN
Qty: 28 TABLET | Refills: 0 | Status: SHIPPED | OUTPATIENT
Start: 2025-04-10 | End: 2025-04-17 | Stop reason: SDUPTHER

## 2025-04-10 RX ORDER — DEXAMETHASONE SODIUM PHOSPHATE 10 MG/ML
INJECTION, SOLUTION INTRAMUSCULAR; INTRAVENOUS
Status: DISCONTINUED | OUTPATIENT
Start: 2025-04-10 | End: 2025-04-10 | Stop reason: SDUPTHER

## 2025-04-10 RX ORDER — HYDRALAZINE HYDROCHLORIDE 20 MG/ML
10 INJECTION INTRAMUSCULAR; INTRAVENOUS
Status: DISCONTINUED | OUTPATIENT
Start: 2025-04-10 | End: 2025-04-10 | Stop reason: HOSPADM

## 2025-04-10 RX ORDER — IPRATROPIUM BROMIDE AND ALBUTEROL SULFATE 2.5; .5 MG/3ML; MG/3ML
1 SOLUTION RESPIRATORY (INHALATION)
Status: DISCONTINUED | OUTPATIENT
Start: 2025-04-10 | End: 2025-04-10 | Stop reason: HOSPADM

## 2025-04-10 RX ORDER — ROCURONIUM BROMIDE 10 MG/ML
INJECTION, SOLUTION INTRAVENOUS
Status: DISCONTINUED | OUTPATIENT
Start: 2025-04-10 | End: 2025-04-10 | Stop reason: SDUPTHER

## 2025-04-10 RX ORDER — FENTANYL CITRATE 50 UG/ML
25 INJECTION, SOLUTION INTRAMUSCULAR; INTRAVENOUS EVERY 5 MIN PRN
Status: COMPLETED | OUTPATIENT
Start: 2025-04-10 | End: 2025-04-10

## 2025-04-10 RX ORDER — LIDOCAINE HYDROCHLORIDE 20 MG/ML
INJECTION, SOLUTION INTRAVENOUS
Status: DISCONTINUED | OUTPATIENT
Start: 2025-04-10 | End: 2025-04-10 | Stop reason: SDUPTHER

## 2025-04-10 RX ORDER — KETOROLAC TROMETHAMINE 30 MG/ML
30 INJECTION, SOLUTION INTRAMUSCULAR; INTRAVENOUS ONCE
Status: COMPLETED | OUTPATIENT
Start: 2025-04-10 | End: 2025-04-10

## 2025-04-10 RX ORDER — LABETALOL HYDROCHLORIDE 5 MG/ML
10 INJECTION, SOLUTION INTRAVENOUS
Status: DISCONTINUED | OUTPATIENT
Start: 2025-04-10 | End: 2025-04-10 | Stop reason: HOSPADM

## 2025-04-10 RX ORDER — BUPIVACAINE HYDROCHLORIDE 2.5 MG/ML
INJECTION, SOLUTION EPIDURAL; INFILTRATION; INTRACAUDAL; PERINEURAL PRN
Status: DISCONTINUED | OUTPATIENT
Start: 2025-04-10 | End: 2025-04-10 | Stop reason: ALTCHOICE

## 2025-04-10 RX ORDER — SODIUM CHLORIDE 0.9 % (FLUSH) 0.9 %
5-40 SYRINGE (ML) INJECTION PRN
Status: DISCONTINUED | OUTPATIENT
Start: 2025-04-10 | End: 2025-04-10 | Stop reason: HOSPADM

## 2025-04-10 RX ORDER — FENTANYL CITRATE 50 UG/ML
INJECTION, SOLUTION INTRAMUSCULAR; INTRAVENOUS
Status: DISCONTINUED | OUTPATIENT
Start: 2025-04-10 | End: 2025-04-10 | Stop reason: SDUPTHER

## 2025-04-10 RX ORDER — NALOXONE HYDROCHLORIDE 0.4 MG/ML
INJECTION, SOLUTION INTRAMUSCULAR; INTRAVENOUS; SUBCUTANEOUS PRN
Status: DISCONTINUED | OUTPATIENT
Start: 2025-04-10 | End: 2025-04-10 | Stop reason: HOSPADM

## 2025-04-10 RX ORDER — PROPOFOL 10 MG/ML
INJECTION, EMULSION INTRAVENOUS
Status: DISCONTINUED | OUTPATIENT
Start: 2025-04-10 | End: 2025-04-10 | Stop reason: SDUPTHER

## 2025-04-10 RX ORDER — ONDANSETRON 2 MG/ML
INJECTION INTRAMUSCULAR; INTRAVENOUS
Status: DISCONTINUED | OUTPATIENT
Start: 2025-04-10 | End: 2025-04-10 | Stop reason: SDUPTHER

## 2025-04-10 RX ORDER — SODIUM CHLORIDE 9 MG/ML
INJECTION, SOLUTION INTRAVENOUS PRN
Status: DISCONTINUED | OUTPATIENT
Start: 2025-04-10 | End: 2025-04-10 | Stop reason: HOSPADM

## 2025-04-10 RX ORDER — PROCHLORPERAZINE EDISYLATE 5 MG/ML
5 INJECTION INTRAMUSCULAR; INTRAVENOUS
Status: DISCONTINUED | OUTPATIENT
Start: 2025-04-10 | End: 2025-04-10 | Stop reason: HOSPADM

## 2025-04-10 RX ORDER — DROPERIDOL 2.5 MG/ML
0.62 INJECTION, SOLUTION INTRAMUSCULAR; INTRAVENOUS
Status: DISCONTINUED | OUTPATIENT
Start: 2025-04-10 | End: 2025-04-10 | Stop reason: HOSPADM

## 2025-04-10 RX ADMIN — MIDAZOLAM 2 MG: 1 INJECTION INTRAMUSCULAR; INTRAVENOUS at 11:17

## 2025-04-10 RX ADMIN — SODIUM CHLORIDE, POTASSIUM CHLORIDE, SODIUM LACTATE AND CALCIUM CHLORIDE: 600; 310; 30; 20 INJECTION, SOLUTION INTRAVENOUS at 09:10

## 2025-04-10 RX ADMIN — FENTANYL CITRATE 25 MCG: 50 INJECTION, SOLUTION INTRAMUSCULAR; INTRAVENOUS at 13:04

## 2025-04-10 RX ADMIN — SUGAMMADEX 500 MG: 100 INJECTION, SOLUTION INTRAVENOUS at 12:32

## 2025-04-10 RX ADMIN — HYDROMORPHONE HYDROCHLORIDE 0.5 MG: 1 INJECTION, SOLUTION INTRAMUSCULAR; INTRAVENOUS; SUBCUTANEOUS at 13:12

## 2025-04-10 RX ADMIN — KETOROLAC TROMETHAMINE 30 MG: 30 INJECTION, SOLUTION INTRAMUSCULAR; INTRAVENOUS at 12:52

## 2025-04-10 RX ADMIN — SODIUM CHLORIDE, POTASSIUM CHLORIDE, SODIUM LACTATE AND CALCIUM CHLORIDE: 600; 310; 30; 20 INJECTION, SOLUTION INTRAVENOUS at 12:20

## 2025-04-10 RX ADMIN — PROPOFOL 200 MG: 10 INJECTION, EMULSION INTRAVENOUS at 11:30

## 2025-04-10 RX ADMIN — DEXAMETHASONE SODIUM PHOSPHATE 10 MG: 10 INJECTION, SOLUTION INTRAMUSCULAR; INTRAVENOUS at 11:30

## 2025-04-10 RX ADMIN — ONDANSETRON 4 MG: 2 INJECTION INTRAMUSCULAR; INTRAVENOUS at 12:05

## 2025-04-10 RX ADMIN — FENTANYL CITRATE 150 MCG: 50 INJECTION, SOLUTION INTRAMUSCULAR; INTRAVENOUS at 11:30

## 2025-04-10 RX ADMIN — ROCURONIUM BROMIDE 10 MG: 10 INJECTION, SOLUTION INTRAVENOUS at 11:30

## 2025-04-10 RX ADMIN — FENTANYL CITRATE 50 MCG: 50 INJECTION, SOLUTION INTRAMUSCULAR; INTRAVENOUS at 12:45

## 2025-04-10 RX ADMIN — ROCURONIUM BROMIDE 30 MG: 10 INJECTION, SOLUTION INTRAVENOUS at 11:41

## 2025-04-10 RX ADMIN — LIDOCAINE HYDROCHLORIDE 100 MG: 20 INJECTION, SOLUTION INTRAVENOUS at 11:30

## 2025-04-10 RX ADMIN — FENTANYL CITRATE 25 MCG: 50 INJECTION, SOLUTION INTRAMUSCULAR; INTRAVENOUS at 12:57

## 2025-04-10 ASSESSMENT — PAIN DESCRIPTION - ONSET
ONSET: ON-GOING

## 2025-04-10 ASSESSMENT — PAIN DESCRIPTION - PAIN TYPE
TYPE: SURGICAL PAIN

## 2025-04-10 ASSESSMENT — PAIN DESCRIPTION - FREQUENCY
FREQUENCY: CONTINUOUS

## 2025-04-10 ASSESSMENT — PAIN SCALES - GENERAL
PAINLEVEL_OUTOF10: 4
PAINLEVEL_OUTOF10: 4
PAINLEVEL_OUTOF10: 5
PAINLEVEL_OUTOF10: 7
PAINLEVEL_OUTOF10: 5

## 2025-04-10 ASSESSMENT — PAIN - FUNCTIONAL ASSESSMENT
PAIN_FUNCTIONAL_ASSESSMENT: ACTIVITIES ARE NOT PREVENTED
PAIN_FUNCTIONAL_ASSESSMENT: NONE - DENIES PAIN
PAIN_FUNCTIONAL_ASSESSMENT: PREVENTS OR INTERFERES SOME ACTIVE ACTIVITIES AND ADLS
PAIN_FUNCTIONAL_ASSESSMENT: ACTIVITIES ARE NOT PREVENTED

## 2025-04-10 ASSESSMENT — PAIN DESCRIPTION - DESCRIPTORS
DESCRIPTORS: ACHING
DESCRIPTORS: BURNING
DESCRIPTORS: ACHING
DESCRIPTORS: BURNING
DESCRIPTORS: ACHING
DESCRIPTORS: ACHING
DESCRIPTORS: BURNING

## 2025-04-10 ASSESSMENT — PAIN DESCRIPTION - LOCATION
LOCATION: KNEE

## 2025-04-10 ASSESSMENT — PAIN DESCRIPTION - ORIENTATION
ORIENTATION: LEFT
ORIENTATION: ANTERIOR
ORIENTATION: LEFT
ORIENTATION: ANTERIOR
ORIENTATION: ANTERIOR
ORIENTATION: LEFT

## 2025-04-10 NOTE — PROGRESS NOTES
CLINICAL PHARMACY NOTE: MEDS TO BEDS    Total # of Prescriptions Filled: 1   The following medications were delivered to the patient:  Norco 5-325 mg    Additional Documentation:

## 2025-04-10 NOTE — OP NOTE
Operative Note      Patient: Medardo Sandoval  YOB: 1975  MRN: [id number]    Date of Procedure: 4/10/2025    Pre-Op Diagnosis Codes:      * Derangement of posterior horn of medial meniscus, left [M23.322]    Post-Op Diagnosis: Same with derangement lateral meniscus left knee, chondromalacia left knee and synovitis left knee       Procedure: Arthroscopic synovectomy and chondroplasty left knee with partial medial and lateral meniscectomies    Surgeon(s):  GIDEON Cummins DO    Assistant:   First Assistant: Rand Hunter RN  Resident: Alonso Mckeon DO    Anesthesia: General    Estimated Blood Loss (mL): Minimal    Complications: None    Specimens:   * No specimens in log *    Implants:  * No implants in log *      Drains: * No LDAs found *    Findings:  Infection Present At Time Of Surgery (PATOS) (choose all levels that have infection present):  No infection present  Other Findings:       Detailed Description of Procedure:   The patient was brought to the operating room after site side were notified.  Adequate general anesthetic was administered by anesthesia with the patient supine on the operating table.  A tourniquet was placed high in the left upper thigh and then the leg was placed in leg cervantes then prepped and draped sterile fashion.  The leg was exsanguinated with elastic wrap tourniquet pressure set at 300 mmHg.    The inferomedial and inferolateral portals were made and any for instrumentation examination.  Knee was thoroughly examined.  The lateral meniscus was very difficult to define and appeared that it may have been discoid.  There is a great deal of synovium beneath it.  The anterior cruciate ligament was somewhat striated and torn anterior superficial.  The lateral meniscus shows complex tear medial to posterior horn areas.  There was some chondral damage to a minor extent on the medial and lateral femoral condyles.  The medial tibial condyle looked good.  There was some on the

## 2025-04-11 ASSESSMENT — ENCOUNTER SYMPTOMS
GASTROINTESTINAL NEGATIVE: 1
SINUS PAIN: 0
SHORTNESS OF BREATH: 0
EYE DISCHARGE: 0
PHOTOPHOBIA: 0
RHINORRHEA: 0
EYE REDNESS: 0
COUGH: 0

## 2025-04-17 DIAGNOSIS — S83.272A COMPLEX TEAR OF LATERAL MENISCUS OF LEFT KNEE, INITIAL ENCOUNTER: Chronic | ICD-10-CM

## 2025-04-17 DIAGNOSIS — M23.322 DERANGEMENT OF POSTERIOR HORN OF MEDIAL MENISCUS, LEFT: ICD-10-CM

## 2025-04-17 DIAGNOSIS — M94.262 CHONDROMALACIA, LEFT KNEE: ICD-10-CM

## 2025-04-17 DIAGNOSIS — M65.962 SYNOVITIS OF LEFT KNEE: Chronic | ICD-10-CM

## 2025-04-17 RX ORDER — HYDROCODONE BITARTRATE AND ACETAMINOPHEN 5; 325 MG/1; MG/1
1 TABLET ORAL EVERY 6 HOURS PRN
Qty: 28 TABLET | Refills: 0 | Status: SHIPPED | OUTPATIENT
Start: 2025-04-17 | End: 2025-04-24

## 2025-04-21 ENCOUNTER — OFFICE VISIT (OUTPATIENT)
Dept: ORTHOPEDIC SURGERY | Age: 50
End: 2025-04-21

## 2025-04-21 VITALS — HEIGHT: 71 IN | BODY MASS INDEX: 44.1 KG/M2 | WEIGHT: 315 LBS

## 2025-04-21 DIAGNOSIS — M23.300 DERANGEMENT OF LATERAL MENISCUS OF RIGHT KNEE: ICD-10-CM

## 2025-04-21 DIAGNOSIS — M65.962 SYNOVITIS OF LEFT KNEE: ICD-10-CM

## 2025-04-21 DIAGNOSIS — M94.262 CHONDROMALACIA, LEFT KNEE: ICD-10-CM

## 2025-04-21 DIAGNOSIS — M23.322 DERANGEMENT OF POSTERIOR HORN OF MEDIAL MENISCUS, LEFT: Primary | ICD-10-CM

## 2025-04-21 PROCEDURE — 99024 POSTOP FOLLOW-UP VISIT: CPT | Performed by: ORTHOPAEDIC SURGERY

## 2025-04-21 NOTE — PROGRESS NOTES
Chief Complaint:   Chief Complaint   Patient presents with    Knee Pain     LEFT KNEE ARTHROSCOPY MENISCAL REPAIR DOS 4/10/25  Patient is following up for a po visit of his left knee. Pain moves around his knee depending on his position or if he is sitting down.        Medardo Sandoval follows up 11 days postop left knee arthroscopic exam and treatment.  He had partial meniscectomies medial and lateral.  He is doing pretty well.  A lot of the discomfort that he was having before is gone.  A lot of the swelling is gone as well.  He is working on his range of motion.      Allergies; medications; past medical, surgical, family, and social history; and problem list have been reviewed today and updated as indicated in this encounter seen below.    Exam: The wounds are well-approximated and healing well.  Steri-Strips are gone.  His calf is supple with no tenderness.  Range of motion is fairly good with slight limitation of extension because of discomfort.  Flexion is good.        ASSESSMENT:    Medardo was seen today for knee pain.    Diagnoses and all orders for this visit:    Derangement of posterior horn of medial meniscus, left    Chondromalacia, left knee        PLAN: Gradually increase activities.  Work on range of motion and particularly extension the left knee.    Return in about 3 weeks (around 5/12/2025).       Current Outpatient Medications   Medication Sig Dispense Refill    HYDROcodone-acetaminophen (NORCO) 5-325 MG per tablet Take 1 tablet by mouth every 6 hours as needed for Pain for up to 7 days. Max Daily Amount: 4 tablets 28 tablet 0    amLODIPine (NORVASC) 10 MG tablet Take 1 tablet by mouth daily 90 tablet 1    chlorhexidine (PERIDEX) 0.12 % solution RINSE TWICE DAILY FOR 30 SECONDS THEN EXPECTORATE      losartan (COZAAR) 25 MG tablet Take 1 tablet by mouth daily 90 tablet 1     No current facility-administered medications for this visit.       Patient Active Problem List   Diagnosis    Primary

## 2025-04-22 NOTE — ANESTHESIA POSTPROCEDURE EVALUATION
Department of Anesthesiology  Postprocedure Note    Patient: Medardo Sandoval  MRN: [id number]  YOB: 1975  Date of evaluation: 4/22/2025    Procedure Summary       Date: 04/10/25 Room / Location: [address]    Anesthesia Start: 1122 Anesthesia Stop: 1246    Procedure: LEFT KNEE ARTHROSCOPY MENISCAL REPAIR (Left: Knee) Diagnosis:       Derangement of posterior horn of medial meniscus, left      (Derangement of posterior horn of medial meniscus, left [M23.322])    Surgeons: GIDEON Cummins DO Responsible Provider: Brice Hong DO    Anesthesia Type: General ASA Status: 3            Anesthesia Type: General    Randolph Phase I: Randolph Score: 10    Randolph Phase II: Randolph Score: 10    Anesthesia Post Evaluation    Patient location during evaluation: PACU  Patient participation: complete - patient participated  Level of consciousness: awake and alert  Pain score: 0  Airway patency: patent  Nausea & Vomiting: no nausea and no vomiting  Cardiovascular status: blood pressure returned to baseline and hemodynamically stable  Respiratory status: acceptable  Hydration status: stable  Pain management: adequate    No notable events documented.

## 2025-06-16 ENCOUNTER — OFFICE VISIT (OUTPATIENT)
Dept: FAMILY MEDICINE CLINIC | Age: 50
End: 2025-06-16
Payer: COMMERCIAL

## 2025-06-16 VITALS
DIASTOLIC BLOOD PRESSURE: 76 MMHG | HEART RATE: 71 BPM | BODY MASS INDEX: 44.1 KG/M2 | WEIGHT: 315 LBS | RESPIRATION RATE: 17 BRPM | TEMPERATURE: 97.4 F | SYSTOLIC BLOOD PRESSURE: 139 MMHG | OXYGEN SATURATION: 96 % | HEIGHT: 71 IN

## 2025-06-16 DIAGNOSIS — M76.62 ACHILLES TENDINITIS, LEFT LEG: Primary | ICD-10-CM

## 2025-06-16 PROCEDURE — 3075F SYST BP GE 130 - 139MM HG: CPT | Performed by: FAMILY MEDICINE

## 2025-06-16 PROCEDURE — 99213 OFFICE O/P EST LOW 20 MIN: CPT | Performed by: FAMILY MEDICINE

## 2025-06-16 PROCEDURE — 3078F DIAST BP <80 MM HG: CPT | Performed by: FAMILY MEDICINE

## 2025-06-16 NOTE — PROGRESS NOTES
Financial Resource Strain (CARDIA)     Difficulty of Paying Living Expenses: Not hard at all   Food Insecurity: No Food Insecurity (2/12/2025)    Hunger Vital Sign     Worried About Running Out of Food in the Last Year: Never true     Ran Out of Food in the Last Year: Never true   Transportation Needs: No Transportation Needs (2/12/2025)    PRAPARE - Transportation     Lack of Transportation (Medical): No     Lack of Transportation (Non-Medical): No   Housing Stability: Low Risk  (2/12/2025)    Housing Stability Vital Sign     Unable to Pay for Housing in the Last Year: No     Number of Times Moved in the Last Year: 0     Homeless in the Last Year: No        SURGICAL HISTORY  Past Surgical History:   Procedure Laterality Date    COLONOSCOPY      FOOT AMPUTATION Right 2006    right foot.  SYME-on right foot    FOOT DEBRIDEMENT  02/12/2012    right foot stump    KNEE ARTHROSCOPY Left 4/10/2025    LEFT KNEE ARTHROSCOPY MENISCAL REPAIR performed by GIDEON Cummins DO at [facility]    LEG SURGERY Right 2007    fractures with pins and screws                 CURRENT MEDICATIONS  Current Outpatient Medications   Medication Sig Dispense Refill    amLODIPine (NORVASC) 10 MG tablet Take 1 tablet by mouth daily 90 tablet 1    chlorhexidine (PERIDEX) 0.12 % solution RINSE TWICE DAILY FOR 30 SECONDS THEN EXPECTORATE      losartan (COZAAR) 25 MG tablet Take 1 tablet by mouth daily 90 tablet 1    famotidine (PEPCID) 40 MG tablet Take 1 tablet by mouth daily 30 tablet 0    lidocaine 4 % external patch Place 1 patch onto the skin daily 30 patch 0     No current facility-administered medications for this visit.       ALLERGIES  No Known Allergies    PHYSICAL EXAM    /76   Pulse 71   Temp 97.4 °F (36.3 °C) (Temporal)   Resp 17   Ht 1.803 m (5' 10.98\")   Wt (!) 147 kg (324 lb)   SpO2 96%   BMI 45.21 kg/m²     Physical Exam  Vitals and nursing note reviewed.   Constitutional:       Appearance: Normal appearance. He is

## 2025-06-21 ENCOUNTER — HOSPITAL ENCOUNTER (EMERGENCY)
Age: 50
Discharge: HOME | End: 2025-06-21
Attending: STUDENT IN AN ORGANIZED HEALTH CARE EDUCATION/TRAINING PROGRAM
Payer: COMMERCIAL

## 2025-06-21 ENCOUNTER — APPOINTMENT (OUTPATIENT)
Dept: CT IMAGING | Age: 50
End: 2025-06-21
Payer: COMMERCIAL

## 2025-06-21 ENCOUNTER — APPOINTMENT (OUTPATIENT)
Dept: GENERAL RADIOLOGY | Age: 50
End: 2025-06-21
Payer: COMMERCIAL

## 2025-06-21 VITALS
DIASTOLIC BLOOD PRESSURE: 78 MMHG | RESPIRATION RATE: 20 BRPM | HEART RATE: 61 BPM | TEMPERATURE: 98.3 F | SYSTOLIC BLOOD PRESSURE: 131 MMHG | OXYGEN SATURATION: 100 %

## 2025-06-21 DIAGNOSIS — I10 HYPERTENSION, UNSPECIFIED TYPE: Primary | ICD-10-CM

## 2025-06-21 DIAGNOSIS — R07.9 CHEST PAIN, UNSPECIFIED TYPE: ICD-10-CM

## 2025-06-21 LAB
ALBUMIN SERPL-MCNC: 4.3 G/DL (ref 3.5–5.2)
ALP SERPL-CCNC: 94 U/L (ref 40–129)
ALT SERPL-CCNC: 29 U/L (ref 0–50)
ANION GAP SERPL CALCULATED.3IONS-SCNC: 9 MMOL/L (ref 7–16)
AST SERPL-CCNC: 22 U/L (ref 0–50)
BASOPHILS # BLD: 0.02 K/UL (ref 0–0.2)
BASOPHILS NFR BLD: 0 % (ref 0–2)
BILIRUB SERPL-MCNC: 0.6 MG/DL (ref 0–1.2)
BUN SERPL-MCNC: 13 MG/DL (ref 6–20)
CALCIUM SERPL-MCNC: 8.8 MG/DL (ref 8.6–10)
CHLORIDE SERPL-SCNC: 101 MMOL/L (ref 98–107)
CO2 SERPL-SCNC: 27 MMOL/L (ref 22–29)
CREAT SERPL-MCNC: 1 MG/DL (ref 0.7–1.2)
EOSINOPHIL # BLD: 0.12 K/UL (ref 0.05–0.5)
EOSINOPHILS RELATIVE PERCENT: 3 % (ref 0–6)
ERYTHROCYTE [DISTWIDTH] IN BLOOD BY AUTOMATED COUNT: 11.8 % (ref 11.5–15)
GFR, ESTIMATED: >90 ML/MIN/1.73M2
GLUCOSE SERPL-MCNC: 112 MG/DL (ref 74–99)
HCT VFR BLD AUTO: 44.2 % (ref 37–54)
HGB BLD-MCNC: 15.2 G/DL (ref 12.5–16.5)
IMM GRANULOCYTES # BLD AUTO: <0.03 K/UL (ref 0–0.58)
IMM GRANULOCYTES NFR BLD: 0 % (ref 0–5)
LIPASE SERPL-CCNC: 96 U/L (ref 13–60)
LYMPHOCYTES NFR BLD: 1.39 K/UL (ref 1.5–4)
LYMPHOCYTES RELATIVE PERCENT: 31 % (ref 20–42)
MCH RBC QN AUTO: 29.3 PG (ref 26–35)
MCHC RBC AUTO-ENTMCNC: 34.4 G/DL (ref 32–34.5)
MCV RBC AUTO: 85.3 FL (ref 80–99.9)
MONOCYTES NFR BLD: 0.59 K/UL (ref 0.1–0.95)
MONOCYTES NFR BLD: 13 % (ref 2–12)
NEUTROPHILS NFR BLD: 53 % (ref 43–80)
NEUTS SEG NFR BLD: 2.43 K/UL (ref 1.8–7.3)
PLATELET # BLD AUTO: 199 K/UL (ref 130–450)
PMV BLD AUTO: 9.6 FL (ref 7–12)
POTASSIUM SERPL-SCNC: 3.9 MMOL/L (ref 3.5–5.1)
PROT SERPL-MCNC: 6.9 G/DL (ref 6.4–8.3)
RBC # BLD AUTO: 5.18 M/UL (ref 3.8–5.8)
SODIUM SERPL-SCNC: 137 MMOL/L (ref 136–145)
TROPONIN I SERPL HS-MCNC: 10 NG/L (ref 0–22)
TROPONIN I SERPL HS-MCNC: 9 NG/L (ref 0–22)
WBC OTHER # BLD: 4.6 K/UL (ref 4.5–11.5)

## 2025-06-21 PROCEDURE — 71045 X-RAY EXAM CHEST 1 VIEW: CPT

## 2025-06-21 PROCEDURE — 6370000000 HC RX 637 (ALT 250 FOR IP)

## 2025-06-21 PROCEDURE — 80053 COMPREHEN METABOLIC PANEL: CPT

## 2025-06-21 PROCEDURE — 2500000003 HC RX 250 WO HCPCS: Performed by: STUDENT IN AN ORGANIZED HEALTH CARE EDUCATION/TRAINING PROGRAM

## 2025-06-21 PROCEDURE — 99285 EMERGENCY DEPT VISIT HI MDM: CPT

## 2025-06-21 PROCEDURE — 84484 ASSAY OF TROPONIN QUANT: CPT

## 2025-06-21 PROCEDURE — 85025 COMPLETE CBC W/AUTO DIFF WBC: CPT

## 2025-06-21 PROCEDURE — 83690 ASSAY OF LIPASE: CPT

## 2025-06-21 PROCEDURE — 96374 THER/PROPH/DIAG INJ IV PUSH: CPT

## 2025-06-21 PROCEDURE — 70450 CT HEAD/BRAIN W/O DYE: CPT

## 2025-06-21 PROCEDURE — 93005 ELECTROCARDIOGRAM TRACING: CPT

## 2025-06-21 PROCEDURE — 6360000002 HC RX W HCPCS: Performed by: STUDENT IN AN ORGANIZED HEALTH CARE EDUCATION/TRAINING PROGRAM

## 2025-06-21 RX ORDER — LIDOCAINE 4 G/G
1 PATCH TOPICAL DAILY
Qty: 30 PATCH | Refills: 0 | Status: SHIPPED | OUTPATIENT
Start: 2025-06-21 | End: 2025-07-21

## 2025-06-21 RX ORDER — FAMOTIDINE 40 MG/1
40 TABLET, FILM COATED ORAL DAILY
Qty: 30 TABLET | Refills: 0 | Status: SHIPPED | OUTPATIENT
Start: 2025-06-21 | End: 2025-07-21

## 2025-06-21 RX ADMIN — PANTOPRAZOLE SODIUM 40 MG: 40 INJECTION, POWDER, FOR SOLUTION INTRAVENOUS at 16:18

## 2025-06-21 RX ADMIN — LIDOCAINE HYDROCHLORIDE: 20 SOLUTION ORAL at 16:16

## 2025-06-21 NOTE — ED PROVIDER NOTES
within normal limits   CBC WITH AUTO DIFFERENTIAL - Abnormal; Notable for the following components:    Monocytes % 13 (*)     Lymphocytes Absolute 1.39 (*)     All other components within normal limits   LIPASE - Abnormal; Notable for the following components:    Lipase 96 (*)     All other components within normal limits   TROPONIN   TROPONIN       When ordered only abnormal lab results are displayed. All other labs were within normal range or not returned as of this dictation.      RADIOLOGY:   Non-plain film images such as CT, Ultrasound and MRI are read by the radiologist. Plain radiographic images are visualized and preliminarily interpreted by the ED Provider with the below findings:    XR CHEST 1 VIEW   Final Result   No acute cardiopulmonary process.         CT HEAD WO CONTRAST   Final Result   No acute intracranial abnormality.               Interpretation per the Radiologist below, if available at the time of this note:        PROCEDURES   Unless otherwise noted below, none     Procedures      PAST MEDICAL HISTORY      has a past medical history of Hypertension.     EMERGENCY DEPARTMENT COURSE and DIFFERENTIAL DIAGNOSIS/MDM:   Vitals:    Vitals:    06/21/25 1538 06/21/25 1549 06/21/25 1804   BP:  (!) 170/93 131/78   Pulse: 84  61   Resp:  20 20   Temp: 98.3 °F (36.8 °C)     SpO2: 96%  100%         ED Course as of 06/22/25 0059   Sat Jun 21, 2025   1709 EKG:  This EKG is signed and interpreted by me.    Rate: 65  Rhythm: Sinus  Interpretation: non-specific EKG, bifascicular block  Comparison: changes compared to previous EKG   [SS]   2009 Call for outpatient stress test to be scheduled on Monday patient would like to be discharged home for decision-making with patient he states he feels well repeat evaluation he would like to be discharged home at this time his blood pressure has improved he is asymptomatic currently he has had symptoms present for several months have been coming and going.  Patient was  offered admission but he would like to follow-up as an outpatient outpatient stress test was ordered [SS]      ED Course User Index  [SS] Hollie Nguyen MD        Is this patient to be included in the SEP-1 core measure? No   Exclusion criteria - the patient is NOT to be included for SEP-1 Core Measure due to:  2+ SIRS criteria are not met     DDX: Hypertensive urgency, hemiplegic migraine, tension headache, muscle strain, biceps tendinitis, ACS    ED course  50-year-old male arrived with a complaint of chronic symptoms ongoing for months including facial tingling, elevated blood pressure and arm pain.  On arrival he appears in no distress.  His vitals are stable.  His exam is reassuring.  Labs were collected indicating a delta negative troponin.  Normal EKG.  White count 4.6.  Patient does have some risk factors and does have pain to his left arm however his heart score is 3 and shared decision-making was had at bedside and patient prefers to return home.  An outpatient stress test was scheduled for him and he was given appropriate follow-up instructions and contact information for this test.  Medications   aluminum & magnesium hydroxide-simethicone (MAALOX PLUS) 30 mL, lidocaine viscous hcl (XYLOCAINE) 5 mL (GI COCKTAIL) ( Oral Given 6/21/25 1616)   pantoprazole (PROTONIX) 40 mg in sodium chloride (PF) 0.9 % 10 mL injection (40 mg IntraVENous Given 6/21/25 1618)    (medications given in the ED and medications ordered by admitting physician at time of submitting note)      CONSULTS: (Who and What was discussed)  None      Social Determinants : None        I am the Primary Clinician of Record.    FINAL IMPRESSION      1. Hypertension, unspecified type    2. Chest pain, unspecified type          DISPOSITION/PLAN     DISPOSITION Decision To Discharge 06/21/2025 08:12:28 PM   DISPOSITION CONDITION Stable           PATIENT REFERRED TO:  No follow-up provider specified.    DISCHARGE MEDICATIONS:  Discharge

## 2025-06-22 LAB
EKG ATRIAL RATE: 65 BPM
EKG P AXIS: 51 DEGREES
EKG P-R INTERVAL: 168 MS
EKG Q-T INTERVAL: 406 MS
EKG QRS DURATION: 130 MS
EKG QTC CALCULATION (BAZETT): 422 MS
EKG R AXIS: -85 DEGREES
EKG T AXIS: 0 DEGREES
EKG VENTRICULAR RATE: 65 BPM

## 2025-06-22 PROCEDURE — 93010 ELECTROCARDIOGRAM REPORT: CPT | Performed by: INTERNAL MEDICINE

## 2025-06-22 NOTE — DISCHARGE INSTRUCTIONS
You need to have a stress test, expect a call on Monday to schedule the test   Start taking pepcid for your chest pain that occurs when you eat to see if you experience any relief , this medication decreases acid production in the stomach

## 2025-06-25 ENCOUNTER — OFFICE VISIT (OUTPATIENT)
Dept: PODIATRY | Age: 50
End: 2025-06-25
Payer: COMMERCIAL

## 2025-06-25 VITALS
WEIGHT: 315 LBS | BODY MASS INDEX: 44.1 KG/M2 | DIASTOLIC BLOOD PRESSURE: 88 MMHG | HEART RATE: 78 BPM | SYSTOLIC BLOOD PRESSURE: 165 MMHG | HEIGHT: 71 IN

## 2025-06-25 DIAGNOSIS — M25.572 PAIN IN LEFT ANKLE AND JOINTS OF LEFT FOOT: ICD-10-CM

## 2025-06-25 DIAGNOSIS — M76.62 ACHILLES BURSITIS OF LEFT LOWER EXTREMITY: Primary | ICD-10-CM

## 2025-06-25 DIAGNOSIS — R26.2 DIFFICULTY WALKING: ICD-10-CM

## 2025-06-25 DIAGNOSIS — S98.911A: ICD-10-CM

## 2025-06-25 DIAGNOSIS — R60.0 LOCALIZED EDEMA: ICD-10-CM

## 2025-06-25 PROCEDURE — 3077F SYST BP >= 140 MM HG: CPT | Performed by: PODIATRIST

## 2025-06-25 PROCEDURE — 3079F DIAST BP 80-89 MM HG: CPT | Performed by: PODIATRIST

## 2025-06-25 PROCEDURE — 99203 OFFICE O/P NEW LOW 30 MIN: CPT | Performed by: PODIATRIST

## 2025-06-25 PROCEDURE — 29580 STRAPPING UNNA BOOT: CPT | Performed by: PODIATRIST

## 2025-06-25 NOTE — PROGRESS NOTES
Patient presents for concerns with tendon pain in the left foot and ankle. Reports that he had a fall down the steps and since then he has had swelling, pain and unable to walk or work. PCP Dr Roger llamas 2 weeks ago

## 2025-06-26 NOTE — PROGRESS NOTES
25     Medardo Sandoval    : 1975 Sex: male   Age: 50 y.o.    Patient was referred by: Geovanny Duran DO  Patient's PCP/Provider is:  Geovanny Duran DO    Subjective:    Patient seen today for evaluation regarding painful Achilles region left lower extremity    Chief Complaint   Patient presents with    Referral - General       HPI: Patient stated issues been going on over the last several months.  Patient just recently had arthroscopic surgery on his left knee.  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  Patient does wear AFO with foot filler on the right and does wear supportive boots with activities.  Patient denies any recent injury to the left lower extremity.  No other additional abnormalities noted.    ROS:  Const: Positives and pertinent negatives as per HPI.     Musculo: Denies symptoms other than stated above.  Neuro: Denies symptoms other than stated above.  Skin: Denies symptoms other than stated above.    Current Medications:    Current Outpatient Medications:     famotidine (PEPCID) 40 MG tablet, Take 1 tablet by mouth daily, Disp: 30 tablet, Rfl: 0    lidocaine 4 % external patch, Place 1 patch onto the skin daily, Disp: 30 patch, Rfl: 0    amLODIPine (NORVASC) 10 MG tablet, Take 1 tablet by mouth daily, Disp: 90 tablet, Rfl: 1    chlorhexidine (PERIDEX) 0.12 % solution, RINSE TWICE DAILY FOR 30 SECONDS THEN EXPECTORATE, Disp: , Rfl:     losartan (COZAAR) 25 MG tablet, Take 1 tablet by mouth daily, Disp: 90 tablet, Rfl: 1    Allergies:  No Known Allergies    Vitals:    25 1503   BP: (!) 165/88   BP Site: Left Upper Arm   Patient Position: Sitting   BP Cuff Size: Large Adult   Pulse: 78   Weight: (!) 147 kg (324 lb)   Height: 1.803 m (5' 11\")        Past Medical History:   Diagnosis Date    Hypertension     not on meds     History reviewed. No pertinent family history.  Past Surgical History:   Procedure Laterality Date

## 2025-07-01 ENCOUNTER — OFFICE VISIT (OUTPATIENT)
Dept: PODIATRY | Age: 50
End: 2025-07-01
Payer: COMMERCIAL

## 2025-07-01 VITALS — WEIGHT: 315 LBS | TEMPERATURE: 97.5 F | BODY MASS INDEX: 45.19 KG/M2

## 2025-07-01 DIAGNOSIS — M25.572 PAIN IN LEFT ANKLE AND JOINTS OF LEFT FOOT: ICD-10-CM

## 2025-07-01 DIAGNOSIS — R26.2 DIFFICULTY WALKING: ICD-10-CM

## 2025-07-01 DIAGNOSIS — R60.0 LOCALIZED EDEMA: ICD-10-CM

## 2025-07-01 DIAGNOSIS — M76.62 ACHILLES BURSITIS OF LEFT LOWER EXTREMITY: Primary | ICD-10-CM

## 2025-07-01 PROCEDURE — 29580 STRAPPING UNNA BOOT: CPT | Performed by: PODIATRIST

## 2025-07-01 PROCEDURE — 99213 OFFICE O/P EST LOW 20 MIN: CPT | Performed by: PODIATRIST

## 2025-07-01 NOTE — PROGRESS NOTES
Here for follow up left unna boot, patient states he got some relief but minimal  Geovanny Duran, DO  LOV 6/16/25

## 2025-07-01 NOTE — PROGRESS NOTES
25     Medardo MARMOLEJO Lori    : 1975   Sex: male    Age: 50 y.o.    Patient's PCP/Provider is:  Geovanny Duran, DO    Subjective:  Patient is seen today for follow-up regarding continued care regarding Achilles bursitis issues left lower extremity.  Overall patient stated the compression dressing did help mildly with his symptoms.  He still getting discomfort into the posterior/distal left heel region with range of motion and muscle testing performed.  Patient is wearing his supportive boots as instructed.  No other additional abnormalities noted at this time.    Chief Complaint   Patient presents with    Foot Pain       ROS:  Const: Positives and pertinent negatives as per HPI.    Musculo: Denies symptoms other than stated above.  Neuro: Denies symptoms other than stated above.  Skin: Denies symptoms other than stated above.    Current Medications:    Current Outpatient Medications:     famotidine (PEPCID) 40 MG tablet, Take 1 tablet by mouth daily, Disp: 30 tablet, Rfl: 0    lidocaine 4 % external patch, Place 1 patch onto the skin daily, Disp: 30 patch, Rfl: 0    amLODIPine (NORVASC) 10 MG tablet, Take 1 tablet by mouth daily, Disp: 90 tablet, Rfl: 1    chlorhexidine (PERIDEX) 0.12 % solution, RINSE TWICE DAILY FOR 30 SECONDS THEN EXPECTORATE, Disp: , Rfl:     losartan (COZAAR) 25 MG tablet, Take 1 tablet by mouth daily, Disp: 90 tablet, Rfl: 1    Allergies:  No Known Allergies    Vitals:    25 0954   Temp: 97.5 °F (36.4 °C)   TempSrc: Temporal   Weight: (!) 147 kg (324 lb)       Exam:  Neurovascular status unchanged.  Tenderness noted distal Achilles insertional region left lower extremity.  Increased thickening noted distal Achilles tendon region, without palpable defect noted.  No erythema, ecchymosis, calor, skin abrasions, or any signs of infection noted left lower extremity.  Antalgic gait noted upon evaluation.      Diagnostic Studies:     No results found.      Procedures:    An unna boot

## 2025-07-08 NOTE — Clinical Note
Carlyle Chaudhary was seen and treated in our emergency department on 5/9/2022. He may return to work on 05/11/2022. If you have any questions or concerns, please don't hesitate to call.       NIDHI Willett Male

## 2025-07-15 ENCOUNTER — OFFICE VISIT (OUTPATIENT)
Dept: PODIATRY | Age: 50
End: 2025-07-15
Payer: COMMERCIAL

## 2025-07-15 VITALS
BODY MASS INDEX: 45.19 KG/M2 | SYSTOLIC BLOOD PRESSURE: 149 MMHG | DIASTOLIC BLOOD PRESSURE: 91 MMHG | WEIGHT: 315 LBS | HEART RATE: 69 BPM | TEMPERATURE: 98.2 F

## 2025-07-15 DIAGNOSIS — R26.2 DIFFICULTY WALKING: ICD-10-CM

## 2025-07-15 DIAGNOSIS — M25.572 PAIN IN LEFT ANKLE AND JOINTS OF LEFT FOOT: ICD-10-CM

## 2025-07-15 DIAGNOSIS — S98.911A: ICD-10-CM

## 2025-07-15 DIAGNOSIS — M76.62 ACHILLES BURSITIS OF LEFT LOWER EXTREMITY: Primary | ICD-10-CM

## 2025-07-15 PROCEDURE — 3080F DIAST BP >= 90 MM HG: CPT | Performed by: PODIATRIST

## 2025-07-15 PROCEDURE — 99214 OFFICE O/P EST MOD 30 MIN: CPT | Performed by: PODIATRIST

## 2025-07-15 PROCEDURE — 3077F SYST BP >= 140 MM HG: CPT | Performed by: PODIATRIST

## 2025-07-15 RX ORDER — PREDNISONE 20 MG/1
40 TABLET ORAL DAILY
Qty: 20 TABLET | Refills: 0 | Status: SHIPPED | OUTPATIENT
Start: 2025-07-15 | End: 2025-07-25

## 2025-07-15 NOTE — PROGRESS NOTES
7/15/25     Medardo Sandoval    : 1975   Sex: male    Age: 50 y.o.    Patient's PCP/Provider is:  Geovanny Duran, DO    Subjective:  Patient is seen today for follow-up regarding continued care regarding Achilles bursitis issues left lower extremity.  Patient presents today to discuss his MRI results.  Patient is still wearing his hightop work boots with daily activities.  Patient denies any additional abnormalities at this time.    Chief Complaint   Patient presents with    Foot Pain    Follow-up       ROS:  Const: Positives and pertinent negatives as per HPI.    Musculo: Denies symptoms other than stated above.  Neuro: Denies symptoms other than stated above.  Skin: Denies symptoms other than stated above.    Current Medications:    Current Outpatient Medications:     predniSONE (DELTASONE) 20 MG tablet, Take 2 tablets by mouth daily for 10 days, Disp: 20 tablet, Rfl: 0    famotidine (PEPCID) 40 MG tablet, Take 1 tablet by mouth daily, Disp: 30 tablet, Rfl: 0    lidocaine 4 % external patch, Place 1 patch onto the skin daily, Disp: 30 patch, Rfl: 0    amLODIPine (NORVASC) 10 MG tablet, Take 1 tablet by mouth daily, Disp: 90 tablet, Rfl: 1    chlorhexidine (PERIDEX) 0.12 % solution, RINSE TWICE DAILY FOR 30 SECONDS THEN EXPECTORATE, Disp: , Rfl:     losartan (COZAAR) 25 MG tablet, Take 1 tablet by mouth daily, Disp: 90 tablet, Rfl: 1    Allergies:  No Known Allergies    Vitals:    07/15/25 1438   BP: (!) 149/91   Pulse: 69   Temp: 98.2 °F (36.8 °C)   TempSrc: Temporal   Weight: (!) 147 kg (324 lb)       Exam:  Neurovascular status unchanged.  Tenderness noted distal Achilles insertional region left lower extremity.  No ecchymotic skin changes noted left lower extremity.  Mild tenderness noted with range of motion left ankle and subtalar joint region.  No palpable defect noted along the course of the distal Achilles tendon left.  Mild residual antalgic gait noted.      Diagnostic Studies:     MRI ANKLE LEFT

## (undated) DEVICE — SHEET PT TRANSFER 80X40 IN LAT AIR NYL GRY COMFORT GLIDE

## (undated) DEVICE — Device

## (undated) DEVICE — NEEDLE SPNL 20GA L3.5IN YEL HUB S STL REG WALL FIT STYL

## (undated) DEVICE — GOWN,SIRUS,POLYRNF,RAGLAN,XL,ST,30/CS: Brand: MEDLINE

## (undated) DEVICE — ZIMMER® STERILE DISPOSABLE TOURNIQUET CUFF WITH PROTECTIVE SLEEVE AND PLC, DUAL PORT, SINGLE BLADDER, 34 IN. (86 CM)

## (undated) DEVICE — SHEET,DRAPE,40X58,STERILE: Brand: MEDLINE

## (undated) DEVICE — APPLICATOR MEDICATED 26 CC SOLUTION HI LT ORNG CHLORAPREP

## (undated) DEVICE — BANDAGE COMPR W6INXL12FT SMOOTH FOR LIMB EXSANG ESMARCH

## (undated) DEVICE — BANDAGE COMPR FOAM 5 YDX6 IN TAN STRL COFLX

## (undated) DEVICE — BLADE,STAINLESS-STEEL,11,STRL,DISPOSABLE: Brand: MEDLINE

## (undated) DEVICE — GOWN,SIRUS,FABRNF,XL,20/CS: Brand: MEDLINE

## (undated) DEVICE — GLOVE ORANGE PI 8   MSG9080

## (undated) DEVICE — SYRINGE MED 10ML LUERLOCK TIP W/O SFTY DISP

## (undated) DEVICE — BASIC DOUBLE BASIN 2-LF: Brand: MEDLINE INDUSTRIES, INC.

## (undated) DEVICE — TUBING, SUCTION, 1/4" X 10', STRAIGHT: Brand: MEDLINE

## (undated) DEVICE — GAUZE,SPONGE,4"X4",16PLY,XRAY,STRL,LF: Brand: MEDLINE

## (undated) DEVICE — GAUZE,SPONGE,4"X4",16PLY,STRL,LF,10/TRAY: Brand: MEDLINE

## (undated) DEVICE — PADDING,UNDERCAST,COTTON, 4"X4YD STERILE: Brand: MEDLINE

## (undated) DEVICE — Y-TYPE TUR/BLADDER IRRIGATION SET, REGULATING CLAMP

## (undated) DEVICE — BANDAGE COMPR M W6INXL10YD WHT BGE VELC E MTRX HK AND LOOP

## (undated) DEVICE — GLOVE ORTHO 8   MSG9480

## (undated) DEVICE — TOWEL,OR,DSP,ST,BLUE,STD,6/PK,12PK/CS: Brand: MEDLINE

## (undated) DEVICE — NDL CNTR 40CT FM MAG: Brand: MEDLINE INDUSTRIES, INC.

## (undated) DEVICE — MARKER,SKIN,WI/RULER AND LABELS: Brand: MEDLINE

## (undated) DEVICE — HYPODERMIC SAFETY NEEDLE: Brand: MAGELLAN

## (undated) DEVICE — STRIP,CLOSURE,WOUND,MEDI-STRIP,1/4X3: Brand: MEDLINE

## (undated) DEVICE — CONNECTOR TBNG WHT PLAS SUCT STR 5IN1 LTWT W/ M CONN

## (undated) DEVICE — SURGICAL PROCEDURE PACK ORTH IV ECLIPSE STRL

## (undated) DEVICE — NEEDLE HYPO 25GA L1.5IN BLU POLYPR HUB S STL REG BVL STR

## (undated) DEVICE — COVER,LIGHT HANDLE,FLX,1/PK: Brand: MEDLINE INDUSTRIES, INC.

## (undated) DEVICE — WIPES SKIN CLOTH READYPREP 9 X 10.5 IN 2% CHLORHEX GLUCONATE CHG PREOP

## (undated) DEVICE — 4-PORT MANIFOLD: Brand: NEPTUNE 2